# Patient Record
Sex: MALE | Employment: OTHER | URBAN - METROPOLITAN AREA
[De-identification: names, ages, dates, MRNs, and addresses within clinical notes are randomized per-mention and may not be internally consistent; named-entity substitution may affect disease eponyms.]

---

## 2017-02-20 ENCOUNTER — OFFICE VISIT (OUTPATIENT)
Dept: INTERNAL MEDICINE CLINIC | Age: 82
End: 2017-02-20

## 2017-02-20 VITALS
HEART RATE: 103 BPM | SYSTOLIC BLOOD PRESSURE: 160 MMHG | TEMPERATURE: 98.3 F | RESPIRATION RATE: 12 BRPM | HEIGHT: 77 IN | WEIGHT: 266.8 LBS | DIASTOLIC BLOOD PRESSURE: 102 MMHG | OXYGEN SATURATION: 97 % | BODY MASS INDEX: 31.5 KG/M2

## 2017-02-20 DIAGNOSIS — M54.2 NECK PAIN: ICD-10-CM

## 2017-02-20 DIAGNOSIS — R51.9 FRONTAL HEADACHE: ICD-10-CM

## 2017-02-20 DIAGNOSIS — I10 BENIGN ESSENTIAL HTN: Primary | ICD-10-CM

## 2017-02-20 RX ORDER — OMEPRAZOLE 20 MG/1
20 TABLET, DELAYED RELEASE ORAL DAILY
COMMUNITY
End: 2018-02-15 | Stop reason: ALTCHOICE

## 2017-02-20 RX ORDER — LISINOPRIL 10 MG/1
10 TABLET ORAL DAILY
Qty: 90 TAB | Refills: 0 | Status: SHIPPED | OUTPATIENT
Start: 2017-02-20 | End: 2017-03-21 | Stop reason: SDUPTHER

## 2017-02-20 RX ORDER — PNEUMOCOCCAL 13-VALENT CONJUGATE VACCINE 2.2; 2.2; 2.2; 2.2; 2.2; 4.4; 2.2; 2.2; 2.2; 2.2; 2.2; 2.2; 2.2 UG/.5ML; UG/.5ML; UG/.5ML; UG/.5ML; UG/.5ML; UG/.5ML; UG/.5ML; UG/.5ML; UG/.5ML; UG/.5ML; UG/.5ML; UG/.5ML; UG/.5ML
0.5 INJECTION, SUSPENSION INTRAMUSCULAR ONCE
Qty: 1 SYRINGE | Refills: 0 | Status: SHIPPED | OUTPATIENT
Start: 2017-02-20 | End: 2017-02-20

## 2017-02-20 NOTE — MR AVS SNAPSHOT
Visit Information Date & Time Provider Department Dept. Phone Encounter #  
 2/20/2017  1:45 PM Azael Chery MD [facility] [address] [id number] Upcoming Health Maintenance Date Due DTaP/Tdap/Td series (1 - Tdap) 9/9/1956 ZOSTER VACCINE AGE 60> 9/9/1997 GLAUCOMA SCREENING Q2Y 9/9/2002 Pneumococcal 65+ Low/Medium Risk (1 of 2 - PCV13) 9/9/2002 MEDICARE YEARLY EXAM 9/9/2002 INFLUENZA AGE 9 TO ADULT 8/1/2016 Allergies as of 2/20/2017  Review Complete On: 2/20/2017 By: Azael Chery MD  
  
 Severity Noted Reaction Type Reactions Influenza Virus Vaccine, Specific  02/20/2017    Other (comments) \"made him sick\" Current Immunizations  Reviewed on 2/20/2017 No immunizations on file. Reviewed by Azael Chery MD on 2/20/2017 at  2:31 PM  
You Were Diagnosed With   
  
 Codes Comments Benign essential HTN    -  Primary ICD-10-CM: I10 
ICD-9-CM: 401.1 Neck pain     ICD-10-CM: M54.2 ICD-9-CM: 723.1 Frontal headache     ICD-10-CM: R51 ICD-9-CM: 418. 0 Vitals BP Pulse Temp Resp Height(growth percentile) Weight(growth percentile) (!) 160/102 (BP 1 Location: Left arm, BP Patient Position: Sitting) (!) 103 98.3 °F (36.8 °C) (Oral) 12 6' 4.5\" (1.943 m) 266 lb 12.8 oz (121 kg) SpO2 BMI Smoking Status 97% 32.05 kg/m2 Former Smoker Vitals History BMI and BSA Data Body Mass Index Body Surface Area 32.05 kg/m 2 2.56 m 2 Preferred Pharmacy Pharmacy Name Phone [facility] #[id number]- [place], [place] -  [facility] [phone] Your Updated Medication List  
  
   
This list is accurate as of: 2/20/17  2:32 PM.  Always use your most recent med list.  
  
  
  
  
 ASPIR-81 PO Take  by mouth. IRON PO Take  by mouth.  
  
 lisinopril 10 mg tablet Commonly known as:  Kiara Cha Take 1 Tab by mouth daily. PREVNAR 13 (PF) 0.5 mL Syrg injection Generic drug:  pneumococcal 13 angelo conj dip  
0.5 mL by IntraMUSCular route once for 1 dose. PLEASE HAVE THIS AT YOUR LOCAL PHARMACY  Indications: PREVENTION OF STREPTOCOCCUS PNEUMONIAE INFECTION PriLOSEC OTC 20 mg tablet Generic drug:  omeprazole Take 20 mg by mouth daily. Prescriptions Printed Refills PREVNAR 13, PF, 0.5 mL syrg injection 0 Si.5 mL by IntraMUSCular route once for 1 dose. PLEASE HAVE THIS AT YOUR LOCAL PHARMACY  Indications: PREVENTION OF STREPTOCOCCUS PNEUMONIAE INFECTION Class: Print Route: IntraMUSCular Prescriptions Sent to Pharmacy Refills  
 lisinopril (PRINIVIL, ZESTRIL) 10 mg tablet 0 Sig: Take 1 Tab by mouth daily. Class: Normal  
 Pharmacy: [facility]/pharmacy [facility] [facility] #: [phone] Route: Oral  
  
We Performed the Following CBC W/O DIFF [42487 CPT(R)] METABOLIC PANEL, COMPREHENSIVE [42423 CPT(R)] Patient Instructions High Blood Pressure: Care Instructions Your Care Instructions If your blood pressure is usually above 140/90, you have high blood pressure, or hypertension. That means the top number is 140 or higher or the bottom number is 90 or higher, or both. Despite what a lot of people think, high blood pressure usually doesn't cause headaches or make you feel dizzy or lightheaded. It usually has no symptoms. But it does increase your risk for heart attack, stroke, and kidney or eye damage. The higher your blood pressure, the more your risk increases. Your doctor will give you a goal for your blood pressure. Your goal will be based on your health and your age. An example of a goal is to keep your blood pressure below 140/90. Lifestyle changes, such as eating healthy and being active, are always important to help lower blood pressure.  You might also take medicine to reach your blood pressure goal. 
Follow-up care is a key part of your treatment and safety. Be sure to make and go to all appointments, and call your doctor if you are having problems. It's also a good idea to know your test results and keep a list of the medicines you take. How can you care for yourself at home? Medical treatment · If you stop taking your medicine, your blood pressure will go back up. You may take one or more types of medicine to lower your blood pressure. Be safe with medicines. Take your medicine exactly as prescribed. Call your doctor if you think you are having a problem with your medicine. · Talk to your doctor before you start taking aspirin every day. Aspirin can help certain people lower their risk of a heart attack or stroke. But taking aspirin isn't right for everyone, because it can cause serious bleeding. · See your doctor regularly. You may need to see the doctor more often at first or until your blood pressure comes down. · If you are taking blood pressure medicine, talk to your doctor before you take decongestants or anti-inflammatory medicine, such as ibuprofen. Some of these medicines can raise blood pressure. · Learn how to check your blood pressure at home. Lifestyle changes · Stay at a healthy weight. This is especially important if you put on weight around the waist. Losing even 10 pounds can help you lower your blood pressure. · If your doctor recommends it, get more exercise. Walking is a good choice. Bit by bit, increase the amount you walk every day. Try for at least 30 minutes on most days of the week. You also may want to swim, bike, or do other activities. · Avoid or limit alcohol. Talk to your doctor about whether you can drink any alcohol. · Try to limit how much sodium you eat to less than 2,300 milligrams (mg) a day. Your doctor may ask you to try to eat less than 1,500 mg a day.  
· Eat plenty of fruits (such as bananas and oranges), vegetables, legumes, whole grains, and low-fat dairy products. · Lower the amount of saturated fat in your diet. Saturated fat is found in animal products such as milk, cheese, and meat. Limiting these foods may help you lose weight and also lower your risk for heart disease. · Do not smoke. Smoking increases your risk for heart attack and stroke. If you need help quitting, talk to your doctor about stop-smoking programs and medicines. These can increase your chances of quitting for good. When should you call for help? Call 911 anytime you think you may need emergency care. This may mean having symptoms that suggest that your blood pressure is causing a serious heart or blood vessel problem. Your blood pressure may be over 180/110. For example, call 911 if: 
· You have symptoms of a heart attack. These may include: ¨ Chest pain or pressure, or a strange feeling in the chest. 
¨ Sweating. ¨ Shortness of breath. ¨ Nausea or vomiting. ¨ Pain, pressure, or a strange feeling in the back, neck, jaw, or upper belly or in one or both shoulders or arms. ¨ Lightheadedness or sudden weakness. ¨ A fast or irregular heartbeat. · You have symptoms of a stroke. These may include: 
¨ Sudden numbness, tingling, weakness, or loss of movement in your face, arm, or leg, especially on only one side of your body. ¨ Sudden vision changes. ¨ Sudden trouble speaking. ¨ Sudden confusion or trouble understanding simple statements. ¨ Sudden problems with walking or balance. ¨ A sudden, severe headache that is different from past headaches. · You have severe back or belly pain. Do not wait until your blood pressure comes down on its own. Get help right away. Call your doctor now or seek immediate care if: 
· Your blood pressure is much higher than normal (such as 180/110 or higher), but you don't have symptoms. · You think high blood pressure is causing symptoms, such as: ¨ Severe headache. ¨ Blurry vision. Watch closely for changes in your health, and be sure to contact your doctor if: 
· Your blood pressure measures 140/90 or higher at least 2 times. That means the top number is 140 or higher or the bottom number is 90 or higher, or both. · You think you may be having side effects from your blood pressure medicine. · Your blood pressure is usually normal, but it goes above normal at least 2 times. Where can you learn more? Go to http://tarun-camelia.info/. Enter N498 in the search box to learn more about \"High Blood Pressure: Care Instructions. \" Current as of: August 8, 2016 Content Version: 11.1 © 8751-2798 [employer]. Care instructions adapted under license by [employer] (which disclaims liability or warranty for this information). If you have questions about a medical condition or this instruction, always ask your healthcare professional. [address] any warranty or liability for your use of this information. Introducing \[facility]\"" & HEALTH SERVICES! Alfredo Kendall introduces [employer] patient portal. Now you can access parts of your medical record, email your doctor's office, and request medication refills online. 1. In your internet browser, go to [email or web address] [employer]/I Had Cancerhart 2. Click on the First Time User? Click Here link in the Sign In box. You will see the New Member Sign Up page. 3. Enter your [employer] Access Code exactly as it appears below. You will not need to use this code after youve completed the sign-up process. If you do not sign up before the expiration date, you must request a new code. · [employer] Access Code: [email or web address] Expires: 5/21/2017  2:32 PM 
 
4. Enter the last four digits of your Social Security Number (xxxx) and Date of Birth (mm/dd/yyyy) as indicated and click Submit. You will be taken to the next sign-up page. 5. Create a [employer] ID.  This will be your [employer] login ID and cannot be changed, so think of one that is secure and easy to remember. 6. Create a [employer] password. You can change your password at any time. 7. Enter your Password Reset Question and Answer. This can be used at a later time if you forget your password. 8. Enter your e-mail address. You will receive e-mail notification when new information is available in [address] 9. Click Sign Up. You can now view and download portions of your medical record. 10. Click the Download Summary menu link to download a portable copy of your medical information. If you have questions, please visit the Frequently Asked Questions section of the [employer] website. Remember, [employer] is NOT to be used for urgent needs. For medical emergencies, dial 911. Now available from your iPhone and Android! Please provide this summary of care documentation to your next provider. Your primary care clinician is listed as Talita Izquierdo. If you have any questions after today's visit, please call [phone].

## 2017-02-20 NOTE — PROGRESS NOTES
Patient states he is here to establish care. He has had pain in the back of his neck, across his forehead. Left ear pops.

## 2017-02-20 NOTE — PATIENT INSTRUCTIONS

## 2017-02-21 LAB
ALBUMIN SERPL-MCNC: 4.1 G/DL (ref 3.5–4.8)
ALBUMIN/GLOB SERPL: 1.5 {RATIO} (ref 1.1–2.5)
ALP SERPL-CCNC: 110 IU/L (ref 39–117)
ALT SERPL-CCNC: 18 IU/L (ref 0–44)
AST SERPL-CCNC: 19 IU/L (ref 0–40)
BILIRUB SERPL-MCNC: 0.3 MG/DL (ref 0–1.2)
BUN SERPL-MCNC: 17 MG/DL (ref 8–27)
BUN/CREAT SERPL: 19 (ref 10–22)
CALCIUM SERPL-MCNC: 9.5 MG/DL (ref 8.6–10.2)
CHLORIDE SERPL-SCNC: 103 MMOL/L (ref 96–106)
CO2 SERPL-SCNC: 23 MMOL/L (ref 18–29)
CREAT SERPL-MCNC: 0.91 MG/DL (ref 0.76–1.27)
ERYTHROCYTE [DISTWIDTH] IN BLOOD BY AUTOMATED COUNT: 13.6 % (ref 12.3–15.4)
GLOBULIN SER CALC-MCNC: 2.8 G/DL (ref 1.5–4.5)
GLUCOSE SERPL-MCNC: 83 MG/DL (ref 65–99)
HCT VFR BLD AUTO: 44.2 % (ref 37.5–51)
HGB BLD-MCNC: 14.8 G/DL (ref 12.6–17.7)
MCH RBC QN AUTO: 31.4 PG (ref 26.6–33)
MCHC RBC AUTO-ENTMCNC: 33.5 G/DL (ref 31.5–35.7)
MCV RBC AUTO: 94 FL (ref 79–97)
PLATELET # BLD AUTO: 275 X10E3/UL (ref 150–379)
POTASSIUM SERPL-SCNC: 4.7 MMOL/L (ref 3.5–5.2)
PROT SERPL-MCNC: 6.9 G/DL (ref 6–8.5)
RBC # BLD AUTO: 4.71 X10E6/UL (ref 4.14–5.8)
SODIUM SERPL-SCNC: 141 MMOL/L (ref 134–144)
WBC # BLD AUTO: 6 X10E3/UL (ref 3.4–10.8)

## 2017-03-21 DIAGNOSIS — I10 BENIGN ESSENTIAL HTN: ICD-10-CM

## 2017-03-21 RX ORDER — LISINOPRIL 20 MG/1
20 TABLET ORAL DAILY
Qty: 90 TAB | Refills: 0 | Status: SHIPPED | OUTPATIENT
Start: 2017-03-21 | End: 2017-05-17 | Stop reason: SDUPTHER

## 2017-05-17 DIAGNOSIS — I10 BENIGN ESSENTIAL HTN: ICD-10-CM

## 2017-05-17 RX ORDER — LISINOPRIL 20 MG/1
20 TABLET ORAL DAILY
Qty: 90 TAB | Refills: 0 | Status: SHIPPED | OUTPATIENT
Start: 2017-05-17 | End: 2017-07-07 | Stop reason: SDUPTHER

## 2017-07-07 ENCOUNTER — OFFICE VISIT (OUTPATIENT)
Dept: INTERNAL MEDICINE CLINIC | Age: 82
End: 2017-07-07

## 2017-07-07 VITALS
HEIGHT: 77 IN | DIASTOLIC BLOOD PRESSURE: 94 MMHG | SYSTOLIC BLOOD PRESSURE: 156 MMHG | RESPIRATION RATE: 12 BRPM | OXYGEN SATURATION: 96 % | TEMPERATURE: 97.9 F | HEART RATE: 72 BPM | BODY MASS INDEX: 30.39 KG/M2 | WEIGHT: 257.4 LBS

## 2017-07-07 DIAGNOSIS — Z13.31 SCREENING FOR DEPRESSION: ICD-10-CM

## 2017-07-07 DIAGNOSIS — Z71.89 ADVANCED CARE PLANNING/COUNSELING DISCUSSION: ICD-10-CM

## 2017-07-07 DIAGNOSIS — M62.838 CERVICAL PARASPINAL MUSCLE SPASM: ICD-10-CM

## 2017-07-07 DIAGNOSIS — Z00.00 MEDICARE ANNUAL WELLNESS VISIT, SUBSEQUENT: Primary | ICD-10-CM

## 2017-07-07 DIAGNOSIS — H69.82 EUSTACHIAN TUBE DYSFUNCTION, LEFT: ICD-10-CM

## 2017-07-07 DIAGNOSIS — Z00.00 ROUTINE GENERAL MEDICAL EXAMINATION AT A HEALTH CARE FACILITY: ICD-10-CM

## 2017-07-07 DIAGNOSIS — I10 BENIGN ESSENTIAL HTN: ICD-10-CM

## 2017-07-07 DIAGNOSIS — Z13.39 SCREENING FOR ALCOHOLISM: ICD-10-CM

## 2017-07-07 RX ORDER — LORATADINE 10 MG/1
10 TABLET ORAL
Qty: 30 TAB | Refills: 0
Start: 2017-07-07 | End: 2018-02-15 | Stop reason: ALTCHOICE

## 2017-07-07 RX ORDER — LISINOPRIL 20 MG/1
20 TABLET ORAL DAILY
Qty: 90 TAB | Refills: 1 | Status: SHIPPED | OUTPATIENT
Start: 2017-07-07 | End: 2018-02-15 | Stop reason: SDUPTHER

## 2017-07-07 NOTE — PROGRESS NOTES
Nurse Navigator Medicare Wellness Visit performed by ZION Pritchard RN    This is a Subsequent AilynCodey Visit providing Personalized Prevention Plan Services (PPPS) (Performed 12 months after initial AWV and PPPS )    I have reviewed the patient's medical history in detail and updated the computerized patient record. History     Past Medical History:   Diagnosis Date    Arthritis     Benign essential HTN     took medication    GERD (gastroesophageal reflux disease)     Hernia     Iron deficiency anemia     Dr. Marian Hernandez. 2014. on Xarelto. did iron infusions.  Pulmonary emboli [facility]) 2014    [facility]. [facility]. Xarelto caused anemia      Past Surgical History:   Procedure Laterality Date    HX COLONOSCOPY  2014    no source of bleeding    HX ENDOSCOPY  2014    no sournce of bleeding    HX ORTHOPAEDIC      trigger fingers both pinky     Current Outpatient Prescriptions   Medication Sig Dispense Refill    lisinopril (PRINIVIL, ZESTRIL) 20 mg tablet Take 1 Tab by mouth daily. Increased dose 3/21/17 90 Tab 1    loratadine (CLARITIN) 10 mg tablet Take 1 Tab by mouth daily as needed for Allergies. 30 Tab 0    ASPIRIN (ASPIR-81 PO) Take  by mouth.  FERROUS SULFATE (IRON PO) Take  by mouth.  omeprazole (PRILOSEC OTC) 20 mg tablet Take 20 mg by mouth daily.        Allergies   Allergen Reactions    Influenza Virus Vaccine, Specific Other (comments)     \"made him sick\"     Family History   Problem Relation Age of Onset    Cancer Sister     Cancer Brother      Social History   Substance Use Topics    Smoking status: Former Smoker    Smokeless tobacco: Never Used    Alcohol use Yes      Comment: rare     Patient Active Problem List   Diagnosis Code    Chest pain, unspecified R07.9    SOB (shortness of breath) R06.02    Abnormal EKG R94.31    Benign essential HTN I10    Advanced care planning/counseling discussion Z71.89       Depression Risk Factor Screening:   Patient denies feelings of being down, depressed or hopeless at this time. Patient states that they have a strong support system within their family & friends. Patient states that he is the primary caregiver for his wife (who is ill) & he takes care of their [address] [place] [address] homes; all of this can be a bit overwhelming, but he states that he handles everything as best he can. Patient denies thoughts of harm to self or others. PHQ over the last two weeks 7/7/2017   PHQ Not Done -   Little interest or pleasure in doing things Not at all   Feeling down, depressed or hopeless Not at all   Total Score PHQ 2 0     Alcohol Risk Factor Screening: On any occasion during the past 3 months, have you had more than 4 drinks containing alcohol? No    Do you average more than 14 drinks per week? No        Functional Ability and Level of Safety:     Hearing Loss   mild    Activities of Daily Living   Self-care. Patient states that he lives with his wife in a private residence ( for 30 years). Patient states independence in all ADLs & denies the use of assistive devices for ambulation. NN encouraged patient to continue and/ or introduce routine physical exercise into their daily routine as applicable & as recommended by PCP. Patient verbalized understanding & agreement to take this into consideration. Patient states that he remains active with the upkeep of his homes/ land in [address] [place] [address] (i.e cutting the grass, cleaning).    Requires assistance with:   ADL Assessment 7/7/2017   Feeding yourself No Help Needed   Getting from bed to chair No Help Needed   Getting dressed No Help Needed   Bathing or showering No Help Needed   Walk across the room (includes cane/walker) No Help Needed   Using the telphone No Help Needed   Taking your medications No Help Needed   Preparing meals No Help Needed   Managing money (expenses/bills) No Help Needed   Moderately strenuous housework (laundry) No Help Needed   Shopping for personal items (toiletries/medicines) No Help Needed   Shopping for groceries No Help Needed   Driving No Help Needed   Climbing a flight of stairs No Help Needed   Getting to places beyond walking distances No Help Needed       Fall Risk   Fall Risk Assessment, last 12 mths 7/7/2017   Able to walk? Yes   Fall in past 12 months? No   Patient denies falls within the past year & verbalizes awareness of fall prevention strategies. Abuse Screen   Patient is not abused     Abuse Screening Questionnaire 7/7/2017   Do you ever feel afraid of your partner? N   Are you in a relationship with someone who physically or mentally threatens you? N   Is it safe for you to go home? Y       Review of Systems   Medicare Wellness Visit    Physical Examination     Evaluation of Cognitive Function:  Mood/affect:  neutral  Appearance: age appropriate and casually dressed  Family member/caregiver input: None present; however, patient reports a strong support system. No exam performed today, Medicare Wellness Visit. Patient Care Team:  Lauren Goodell, MD as PCP - General (Internal Medicine)    Advice/Referrals/Counseling   Education and counseling provided:  End-of-Life planning (with patient's consent)  Pneumococcal Vaccine  Influenza Vaccine  Prostate cancer screening tests (PSA, covered annually)  Colorectal cancer screening tests  Screening for glaucoma  tdap & shingles vaccinations      Assessment/Plan   1. NN discussed with patient about Advance Medical Directive & reviewed documentation with patient. Patient respectfully declined paperwork, but will consider for the future. 2. Patient declines all recommended immunizations (flu, tdap, shingles, pneumonia 23 & prevnar 13). NN informed patient of the benefits to receiving the recommended vaccinations. Patient verbalized understanding, but once again respectfully declined. PCP notified. Patient's health maintenance immunization record has been updated & is current.      3. Patient states that he follows his PCP's recommendations for PSA screenings & Colonoscopy screenings. Patient reports that he had a screening colonoscopy last year at [facility], but he is unable to recall the name of the GI physician. Patient states that the results were normal. REJI faxed Maxime Richard, [address] [facility] requesting a copy of patient's last colonoscopy screening report with patient's verbal approval.     4. Patient wears corrective lenses. Patient states that it has been a few years since his last routine eye exam & glaucoma screening. Patient verbalized awareness that he is due for a routine eye exam & glaucoma screening. Patient plans on going to Dr. Regino Koenig at [facility] as this is where his wife gets her eye exams. NN requested that patient notify PCP's office after eye exam completed. Patient verbalized agreement. Patient verbalized understanding of all information discussed. Patient was given the opportunity to ask questions. Medication reconciliation completed by MA/ LPN and reviewed by PCP. Patient provided AVS, either a printed version or electronic version in [employer], which includes Medicare Wellness Preventative Screening Table.

## 2017-07-07 NOTE — PROGRESS NOTES
HISTORY OF PRESENT ILLNESS  Carlos Weller is a 80 y.o. male. HPI   Provider Addendum  I personally saw and examined the patient. I have discussed and reviewed note with Nurse Navigator and agree with the Nurse Navigator's findings and recommendations for this Wellness Exam.  I was present during the key portions of separately billed procedures. Orders written and signed by me as per chart. EVONNE Arce    Subjective:   Carlos Weller is a 80 y.o. male with hypertension. Hypertension ROS: taking medications as instructed, no medication side effects noted, home BP monitoring in range of 973'D systolic over 77-03'S diastolic, no TIA's, no chest pain on exertion, no dyspnea on exertion, no swelling of ankles. New concerns: has been doing well with Lisinopril 20 mg daily; checks blood pressures at home and reports they run consistently in 103-515 systolic range; reports history of white coat hypertension. Complains of transient left sided neck pain and stiffness that radiates to left ear. Denies any specific injury but pain started last week. Had similar symptoms when he was seen last in 2/2017. Symptoms have improved significantly over the past several days.     Patient Active Problem List   Diagnosis Code    Chest pain, unspecified R07.9    SOB (shortness of breath) R06.02    Abnormal EKG R94.31    Benign essential HTN I10    Advanced care planning/counseling discussion Z71.89     Past Surgical History:   Procedure Laterality Date    HX COLONOSCOPY  2014    no source of bleeding    HX ENDOSCOPY  2014    no sournce of bleeding    HX ORTHOPAEDIC      trigger fingers both pinky     Social History     Social History    Marital status:      Spouse name: 4+1    Number of children: N/A    Years of education: N/A     Occupational History    Retired       Social History Main Topics    Smoking status: Former Smoker    Smokeless tobacco: Never Used    Alcohol use Yes Comment: rare    Drug use: Not on file    Sexual activity: No     Other Topics Concern    Not on file     Social History Narrative     Family History   Problem Relation Age of Onset    Cancer Sister     Cancer Brother      Current Outpatient Prescriptions   Medication Sig    lisinopril (PRINIVIL, ZESTRIL) 20 mg tablet Take 1 Tab by mouth daily. Increased dose 3/21/17    loratadine (CLARITIN) 10 mg tablet Take 1 Tab by mouth daily as needed for Allergies.  ASPIRIN (ASPIR-81 PO) Take  by mouth.  FERROUS SULFATE (IRON PO) Take  by mouth.  omeprazole (PRILOSEC OTC) 20 mg tablet Take 20 mg by mouth daily. No current facility-administered medications for this visit. Allergies   Allergen Reactions    Influenza Virus Vaccine, Specific Other (comments)     \"made him sick\"       There is no immunization history on file for this patient. Review of Systems   Constitutional: Negative for chills, fever and malaise/fatigue. HENT: Negative for congestion and sore throat. Respiratory: Negative for cough and shortness of breath. Cardiovascular: Negative for chest pain and palpitations. Gastrointestinal: Negative for nausea and vomiting. Musculoskeletal: Positive for neck pain. Skin: Negative for rash. Neurological: Negative for dizziness, tingling and headaches. BP (!) 156/94  Pulse 72  Temp 97.9 °F (36.6 °C) (Oral)   Resp 12  Ht 6' 4.5\" (1.943 m)  Wt 257 lb 6.4 oz (116.8 kg)  SpO2 96%  BMI 30.92 kg/m2  Physical Exam   Constitutional: He appears well-developed and well-nourished. HENT:   Head: Normocephalic and atraumatic. Right Ear: External ear and ear canal normal. Tympanic membrane is not injected and not erythematous. Left Ear: External ear and ear canal normal. Tympanic membrane is not injected and not erythematous. Nose: No mucosal edema. Mouth/Throat: No posterior oropharyngeal erythema. Neck: Neck supple. Muscular tenderness present.  Decreased range of motion present. No thyromegaly present. Lymphadenopathy:     He has no cervical adenopathy. Nursing note and vitals reviewed. ASSESSMENT and PLAN  Javid Natarajan was seen today for neck pain and annual wellness visit. Diagnoses and all orders for this visit:    Medicare annual wellness visit, subsequent    Benign essential HTN - reports blood pressures at home have been well controlled. -     lisinopril (PRINIVIL, ZESTRIL) 20 mg tablet; Take 1 Tab by mouth daily. Increased dose 3/21/17    Cervical paraspinal muscle spasm -- moist heat to area and stretching exercises. Hesitant to give any muscle relaxants due to sedative effects. Follow up INI. Eustachian tube dysfunction, left  -     loratadine (CLARITIN) 10 mg tablet; Take 1 Tab by mouth daily as needed for Allergies.     Advanced care planning/counseling discussion    Routine general medical examination at a health care facility    Screening for alcoholism    Screening for depression            lab results and schedule of future lab studies reviewed with patient  reviewed diet, exercise and weight control  reviewed medications and side effects in detail

## 2017-07-07 NOTE — MR AVS SNAPSHOT
Visit Information Date & Time Provider Department Dept. Phone Encounter #  
 7/7/2017  8:40 AM Alaina Holm NP [facility] [address] [id number] Upcoming Health Maintenance Date Due DTaP/Tdap/Td series (1 - Tdap) 9/9/1956 ZOSTER VACCINE AGE 60> 9/9/1997 GLAUCOMA SCREENING Q2Y 9/9/2002 Pneumococcal 65+ Low/Medium Risk (1 of 2 - PCV13) 9/9/2002 MEDICARE YEARLY EXAM 9/9/2002 INFLUENZA AGE 9 TO ADULT 8/1/2017 Allergies as of 7/7/2017  Review Complete On: 7/7/2017 By: Alaina Holm NP Severity Noted Reaction Type Reactions Influenza Virus Vaccine, Specific  02/20/2017    Other (comments) \"made him sick\" Current Immunizations  Reviewed on 2/20/2017 No immunizations on file. Not reviewed this visit You Were Diagnosed With   
  
 Codes Comments Cervical paraspinal muscle spasm    -  Primary ICD-10-CM: I76.314 ICD-9-CM: 728.85 Benign essential HTN     ICD-10-CM: I10 
ICD-9-CM: 401.1 Eustachian tube dysfunction, left     ICD-10-CM: S74.35 ICD-9-CM: 381.81 Vitals BP Pulse Temp Resp Height(growth percentile) Weight(growth percentile) (!) 170/106 (BP 1 Location: Right arm, BP Patient Position: Sitting) 72 97.9 °F (36.6 °C) (Oral) 12 6' 4.5\" (1.943 m) 257 lb 6.4 oz (116.8 kg) SpO2 BMI Smoking Status 96% 30.92 kg/m2 Former Smoker Vitals History BMI and BSA Data Body Mass Index Body Surface Area 30.92 kg/m 2 2.51 m 2 Preferred Pharmacy Pharmacy Name Phone [id number] [address] [phone] Your Updated Medication List  
  
   
This list is accurate as of: 7/7/17  9:35 AM.  Always use your most recent med list.  
  
  
  
  
 ASPIR-81 PO Take  by mouth. IRON PO Take  by mouth.  
  
 lisinopril 20 mg tablet Commonly known as:  Calli Kee  
 Take 1 Tab by mouth daily. Increased dose 3/21/17  
  
 loratadine 10 mg tablet Commonly known as:  Ricarda Sofia Take 1 Tab by mouth daily as needed for Allergies. PriLOSEC OTC 20 mg tablet Generic drug:  omeprazole Take 20 mg by mouth daily. Prescriptions Sent to Pharmacy Refills  
 lisinopril (PRINIVIL, ZESTRIL) 20 mg tablet 1 Sig: Take 1 Tab by mouth daily. Increased dose 3/21/17 Class: Normal  
 Pharmacy: [facility] [address]  #: [phone] Route: Oral  
  
Patient Instructions DASH Diet: Care Instructions Your Care Instructions The DASH diet is an eating plan that can help lower your blood pressure. DASH stands for Dietary Approaches to Stop Hypertension. Hypertension is high blood pressure. The DASH diet focuses on eating foods that are high in calcium, potassium, and magnesium. These nutrients can lower blood pressure. The foods that are highest in these nutrients are fruits, vegetables, low-fat dairy products, nuts, seeds, and legumes. But taking calcium, potassium, and magnesium supplements instead of eating foods that are high in those nutrients does not have the same effect. The DASH diet also includes whole grains, fish, and poultry. The DASH diet is one of several lifestyle changes your doctor may recommend to lower your high blood pressure. Your doctor may also want you to decrease the amount of sodium in your diet. Lowering sodium while following the DASH diet can lower blood pressure even further than just the DASH diet alone. Follow-up care is a key part of your treatment and safety. Be sure to make and go to all appointments, and call your doctor if you are having problems. It's also a good idea to know your test results and keep a list of the medicines you take. How can you care for yourself at home? Following the DASH diet · Eat 4 to 5 servings of fruit each day. A serving is 1 medium-sized piece of fruit, ½ cup chopped or canned fruit, 1/4 cup dried fruit, or 4 ounces (½ cup) of fruit juice. Choose fruit more often than fruit juice. · Eat 4 to 5 servings of vegetables each day. A serving is 1 cup of lettuce or raw leafy vegetables, ½ cup of chopped or cooked vegetables, or 4 ounces (½ cup) of vegetable juice. Choose vegetables more often than vegetable juice. · Get 2 to 3 servings of low-fat and fat-free dairy each day. A serving is 8 ounces of milk, 1 cup of yogurt, or 1 ½ ounces of cheese. · Eat 6 to 8 servings of grains each day. A serving is 1 slice of bread, 1 ounce of dry cereal, or ½ cup of cooked rice, pasta, or cooked cereal. Try to choose whole-grain products as much as possible. · Limit lean meat, poultry, and fish to 2 servings each day. A serving is 3 ounces, about the size of a deck of cards. · Eat 4 to 5 servings of nuts, seeds, and legumes (cooked dried beans, lentils, and split peas) each week. A serving is 1/3 cup of nuts, 2 tablespoons of seeds, or ½ cup of cooked beans or peas. · Limit fats and oils to 2 to 3 servings each day. A serving is 1 teaspoon of vegetable oil or 2 tablespoons of salad dressing. · Limit sweets and added sugars to 5 servings or less a week. A serving is 1 tablespoon jelly or jam, ½ cup sorbet, or 1 cup of lemonade. · Eat less than 2,300 milligrams (mg) of sodium a day. If you limit your sodium to 1,500 mg a day, you can lower your blood pressure even more. Tips for success · Start small. Do not try to make dramatic changes to your diet all at once. You might feel that you are missing out on your favorite foods and then be more likely to not follow the plan. Make small changes, and stick with them. Once those changes become habit, add a few more changes. · Try some of the following: ¨ Make it a goal to eat a fruit or vegetable at every meal and at snacks. This will make it easy to get the recommended amount of fruits and vegetables each day. ¨ Try yogurt topped with fruit and nuts for a snack or healthy dessert. ¨ Add lettuce, tomato, cucumber, and onion to sandwiches. ¨ Combine a ready-made pizza crust with low-fat mozzarella cheese and lots of vegetable toppings. Try using tomatoes, squash, spinach, broccoli, carrots, cauliflower, and onions. ¨ Have a variety of cut-up vegetables with a low-fat dip as an appetizer instead of chips and dip. ¨ Sprinkle sunflower seeds or chopped almonds over salads. Or try adding chopped walnuts or almonds to cooked vegetables. ¨ Try some vegetarian meals using beans and peas. Add garbanzo or kidney beans to salads. Make burritos and tacos with mashed damon beans or black beans. Where can you learn more? Go to http://tarun-camelia.info/. Enter M857 in the search box to learn more about \"DASH Diet: Care Instructions. \" Current as of: April 3, 2017 Content Version: 11.3 © 7780-8766 [employer]. Care instructions adapted under license by [employer] (which disclaims liability or warranty for this information). If you have questions about a medical condition or this instruction, always ask your healthcare professional. [place] [address] any warranty or liability for your use of this information. Neck Spasm: Care Instructions Your Care Instructions A neck spasm is sudden tightness and pain in your neck muscles. A spasm may be caused by some activities or repeated movements. For example, you may be more likely to have a neck spasm if you slouch, paint a ceiling, work at a computer, or sleep with your neck twisted. But the cause isn't always clear. Home treatment includes using heat or ice, taking over-the-counter (OTC) pain medicines, and avoiding activities that may lead to neck pain.  Gentle stretching, or treatments such as massage or manipulation, may also help ease a neck spasm. For a neck spasm that doesn't get better with home care, your doctor may prescribe medicine. He or she may also suggest exercise or physical therapy to help strengthen or relax your neck muscles. Follow-up care is a key part of your treatment and safety. Be sure to make and go to all appointments, and call your doctor if you are having problems. It's also a good idea to know your test results and keep a list of the medicines you take. How can you care for yourself at home? · To relieve pain, use heat or ice (whichever feels better) on the affected area. ¨ Put a warm water bottle, a heating pad set on low, or a warm cloth on your neck. Put a thin cloth between the heating pad and your skin. Do not go to sleep with a heating pad on your skin. ¨ Try ice or a cold pack on the area for 10 to 20 minutes at a time. Put a thin cloth between the ice and your skin. · Ask your doctor if you can take acetaminophen (such as Tylenol) or nonsteroidal anti-inflammatory drugs, such as ibuprofen or naproxen. Your doctor can prescribe stronger medicines if needed. Be safe with medicines. Read and follow all instructions on the label. · Stretch your muscles every day, especially before and after exercise and at bedtime. Regular stretching can help relax your muscles. · Try to find a pillow and a position in bed that help improve your night's rest. 
· Try to stay active. It's best to start activity slowly. If an exercise makes your pain worse, stop doing it. When should you call for help? Call 911 anytime you think you may need emergency care. For example, call if: 
· You are unable to move an arm or a leg at all. Call your doctor now or seek immediate medical care if: 
· You have new or worse symptoms in your arms, legs, belly, or buttocks. Symptoms may include: ¨ Numbness or tingling. ¨ Weakness. ¨ Pain. · You lose bladder or bowel control. Watch closely for changes in your health, and be sure to contact your doctor if: 
· You do not get better as expected. Where can you learn more? Go to http://tarun-camelia.info/. Enter G415 in the search box to learn more about \"Neck Spasm: Care Instructions. \" Current as of: March 21, 2017 Content Version: 11.3 © 4881-3811 [employer]. Care instructions adapted under license by [employer] (which disclaims liability or warranty for this information). If you have questions about a medical condition or this instruction, always ask your healthcare professional. [place] [address] any warranty or liability for your use of this information. Eustachian Tube Problems: Care Instructions Your Care Instructions The eustachian (say \"you-STAY-shee-un\") tubes run between the inside of the ears and the throat. They keep air pressure stable in the ears. If your eustachian tubes become blocked, the air pressure in your ears changes. The fluids from a cold can clog eustachian tubes, causing pain in the ears. A quick change in air pressure can cause eustachian tubes to close up. This might happen when an airplane changes altitude or when a  goes up or down underwater. Eustachian tube problems often clear up on their own or after antibiotic treatment. If your tubes continue to be blocked, you may need surgery. Follow-up care is a key part of your treatment and safety. Be sure to make and go to all appointments, and call your doctor if you are having problems. It's also a good idea to know your test results and keep a list of the medicines you take. How can you care for yourself at home? · To ease ear pain, apply a warm washcloth or a heating pad set on low. There may be some drainage from the ear when the heat melts earwax. Put a cloth between the heat source and your skin.  Do not use a heating pad with children. · If your doctor prescribed antibiotics, take them as directed. Do not stop taking them just because you feel better. You need to take the full course of antibiotics. · Your doctor may recommend over-the-counter medicine. Be safe with medicines. Oral or nasal decongestants may relieve ear pain. Avoid decongestants that are combined with antihistamines, which tend to cause more blockage. But if allergies seem to be the problem, your doctor may recommend a combination. Be careful with cough and cold medicines. Don't give them to children younger than 6, because they don't work for children that age and can even be harmful. For children 6 and older, always follow all the instructions carefully. Make sure you know how much medicine to give and how long to use it. And use the dosing device if one is included. When should you call for help? Call your doctor now or seek immediate medical care if: 
· You develop sudden, complete hearing loss. · You have severe pain or feel dizzy. · You have new or increasing pus or blood draining from your ear. · You have redness, swelling, or pain around or behind the ear. Watch closely for changes in your health, and be sure to contact your doctor if: 
· You do not get better after 2 weeks. · You have any new symptoms, such as itching or a feeling of fullness in the ear. Where can you learn more? Go to http://tarun-camelia.info/. Enter Y822 in the search box to learn more about \"Eustachian Tube Problems: Care Instructions. \" Current as of: May 4, 2017 Content Version: 11.3 © 3069-8655 [employer]. Care instructions adapted under license by [employer] (which disclaims liability or warranty for this information). If you have questions about a medical condition or this instruction, always ask your healthcare professional. [address] any warranty or liability for your use of this information. Neck: Exercises Your Care Instructions Here are some examples of typical rehabilitation exercises for your condition. Start each exercise slowly. Ease off the exercise if you start to have pain. Your doctor or physical therapist will tell you when you can start these exercises and which ones will work best for you. How to do the exercises Note: Stretching should make you feel a gentle stretch, but no pain. Stop any strengthening exercise that makes pain worse. Neck stretch 1. This stretch works best if you keep your shoulder down as you lean away from it. To help you remember to do this, start by relaxing your shoulders and lightly holding on to your thighs or your chair. 2. Tilt your head toward your shoulder and hold for 15 to 30 seconds. Let the weight of your head stretch your muscles. 3. If you would like a little added stretch, use your hand to gently and steadily pull your head toward your shoulder. For example, keeping your right shoulder down, lean your head to the left. 4. Repeat 2 to 4 times toward each shoulder. Diagonal neck stretch 1. Turn your head slightly toward the direction you will be stretching, and tilt your head diagonally toward your chest and hold for 15 to 30 seconds. 2. If you would like a little added stretch, use your hand to gently and steadily pull your head forward on the diagonal. 
3. Repeat 2 to 4 times toward each side. Dorsal glide stretch 1. Sit or stand tall and look straight ahead. 2. Slowly tuck your chin as you glide your head backward over your body 3. Hold for a count of 6, and then relax for up to 10 seconds. 4. Repeat 8 to 12 times. Note: The dorsal glide stretches the back of the neck. If you feel pain, do not glide so far back. Some people find this exercise easier to do while lying on their backs with an ice pack on the neck. Chest and shoulder stretch 1. Sit or stand tall and glide your head backward as in the dorsal glide stretch. 2. Raise both arms so that your hands are next to your ears. 3. Take a deep breath, and as you breathe out, lower your elbows down and behind your back. You will feel your shoulder blades slide down and together, and at the same time you will feel a stretch across your chest and the front of your shoulders. 4. Hold for about 6 seconds, and then relax for up to 10 seconds. 5. Repeat 8 to 12 times. Strengthening: Hands on head 1. Move your head backward, forward, and side to side against gentle pressure from your hands, holding each position for about 6 seconds. 2. Repeat 8 to 12 times. Follow-up care is a key part of your treatment and safety. Be sure to make and go to all appointments, and call your doctor if you are having problems. It's also a good idea to know your test results and keep a list of the medicines you take. Where can you learn more? Go to http://tarun-camelia.info/. Enter P975 in the search box to learn more about \"Neck: Exercises. \" Current as of: March 21, 2017 Content Version: 11.3 © 1015-0789 [employer]. Care instructions adapted under license by [facility] (which disclaims liability or warranty for this information). If you have questions about a medical condition or this instruction, always ask your healthcare professional. [address] any warranty or liability for your use of this information. Introducing \[facility]\"" & HEALTH SERVICES! Aditi Obrien introduces [employer] patient portal. Now you can access parts of your medical record, email your doctor's office, and request medication refills online. 1. In your internet browser, go to [email or web address] [email or web address] 2. Click on the First Time User? Click Here link in the Sign In box. You will see the New Member Sign Up page. 3. Enter your [employer] Access Code exactly as it appears below.  You will not need to use this code after youve completed the sign-up process. If you do not sign up before the expiration date, you must request a new code. · [employer] Access Code: [email or web address] Expires: 10/5/2017  9:35 AM 
 
4. Enter the last four digits of your Social Security Number (xxxx) and Date of Birth (mm/dd/yyyy) as indicated and click Submit. You will be taken to the next sign-up page. 5. Create a [employer] ID. This will be your [employer] login ID and cannot be changed, so think of one that is secure and easy to remember. 6. Create a [employer] password. You can change your password at any time. 7. Enter your Password Reset Question and Answer. This can be used at a later time if you forget your password. 8. Enter your e-mail address. You will receive e-mail notification when new information is available in [address]. 9. Click Sign Up. You can now view and download portions of your medical record. 10. Click the Download Summary menu link to download a portable copy of your medical information. If you have questions, please visit the Frequently Asked Questions section of the [employer] website. Remember, [employer] is NOT to be used for urgent needs. For medical emergencies, dial 911. Now available from your iPhone and Android! Please provide this summary of care documentation to your next provider. Your primary care clinician is listed as Clarita Dhaliwal. If you have any questions after today's visit, please call [phone].

## 2017-07-07 NOTE — PATIENT INSTRUCTIONS
DASH Diet: Care Instructions  Your Care Instructions  The DASH diet is an eating plan that can help lower your blood pressure. DASH stands for Dietary Approaches to Stop Hypertension. Hypertension is high blood pressure. The DASH diet focuses on eating foods that are high in calcium, potassium, and magnesium. These nutrients can lower blood pressure. The foods that are highest in these nutrients are fruits, vegetables, low-fat dairy products, nuts, seeds, and legumes. But taking calcium, potassium, and magnesium supplements instead of eating foods that are high in those nutrients does not have the same effect. The DASH diet also includes whole grains, fish, and poultry. The DASH diet is one of several lifestyle changes your doctor may recommend to lower your high blood pressure. Your doctor may also want you to decrease the amount of sodium in your diet. Lowering sodium while following the DASH diet can lower blood pressure even further than just the DASH diet alone. Follow-up care is a key part of your treatment and safety. Be sure to make and go to all appointments, and call your doctor if you are having problems. It's also a good idea to know your test results and keep a list of the medicines you take. How can you care for yourself at home? Following the DASH diet  · Eat 4 to 5 servings of fruit each day. A serving is 1 medium-sized piece of fruit, ½ cup chopped or canned fruit, 1/4 cup dried fruit, or 4 ounces (½ cup) of fruit juice. Choose fruit more often than fruit juice. · Eat 4 to 5 servings of vegetables each day. A serving is 1 cup of lettuce or raw leafy vegetables, ½ cup of chopped or cooked vegetables, or 4 ounces (½ cup) of vegetable juice. Choose vegetables more often than vegetable juice. · Get 2 to 3 servings of low-fat and fat-free dairy each day. A serving is 8 ounces of milk, 1 cup of yogurt, or 1 ½ ounces of cheese. · Eat 6 to 8 servings of grains each day.  A serving is 1 slice of bread, 1 ounce of dry cereal, or ½ cup of cooked rice, pasta, or cooked cereal. Try to choose whole-grain products as much as possible. · Limit lean meat, poultry, and fish to 2 servings each day. A serving is 3 ounces, about the size of a deck of cards. · Eat 4 to 5 servings of nuts, seeds, and legumes (cooked dried beans, lentils, and split peas) each week. A serving is 1/3 cup of nuts, 2 tablespoons of seeds, or ½ cup of cooked beans or peas. · Limit fats and oils to 2 to 3 servings each day. A serving is 1 teaspoon of vegetable oil or 2 tablespoons of salad dressing. · Limit sweets and added sugars to 5 servings or less a week. A serving is 1 tablespoon jelly or jam, ½ cup sorbet, or 1 cup of lemonade. · Eat less than 2,300 milligrams (mg) of sodium a day. If you limit your sodium to 1,500 mg a day, you can lower your blood pressure even more. Tips for success  · Start small. Do not try to make dramatic changes to your diet all at once. You might feel that you are missing out on your favorite foods and then be more likely to not follow the plan. Make small changes, and stick with them. Once those changes become habit, add a few more changes. · Try some of the following:  ¨ Make it a goal to eat a fruit or vegetable at every meal and at snacks. This will make it easy to get the recommended amount of fruits and vegetables each day. ¨ Try yogurt topped with fruit and nuts for a snack or healthy dessert. ¨ Add lettuce, tomato, cucumber, and onion to sandwiches. ¨ Combine a ready-made pizza crust with low-fat mozzarella cheese and lots of vegetable toppings. Try using tomatoes, squash, spinach, broccoli, carrots, cauliflower, and onions. ¨ Have a variety of cut-up vegetables with a low-fat dip as an appetizer instead of chips and dip. ¨ Sprinkle sunflower seeds or chopped almonds over salads. Or try adding chopped walnuts or almonds to cooked vegetables. ¨ Try some vegetarian meals using beans and peas. Add garbanzo or kidney beans to salads. Make burritos and tacos with mashed damon beans or black beans. Where can you learn more? Go to http://tarun-camelia.info/. Enter E575 in the search box to learn more about \"DASH Diet: Care Instructions. \"  Current as of: April 3, 2017  Content Version: 11.3  © 8624-7075 [employer]. Care instructions adapted under license by [employer] (which disclaims liability or warranty for this information). If you have questions about a medical condition or this instruction, always ask your healthcare professional. [place] [address] any warranty or liability for your use of this information. Neck Spasm: Care Instructions  Your Care Instructions  A neck spasm is sudden tightness and pain in your neck muscles. A spasm may be caused by some activities or repeated movements. For example, you may be more likely to have a neck spasm if you slouch, paint a ceiling, work at a computer, or sleep with your neck twisted. But the cause isn't always clear. Home treatment includes using heat or ice, taking over-the-counter (OTC) pain medicines, and avoiding activities that may lead to neck pain. Gentle stretching, or treatments such as massage or manipulation, may also help ease a neck spasm. For a neck spasm that doesn't get better with home care, your doctor may prescribe medicine. He or she may also suggest exercise or physical therapy to help strengthen or relax your neck muscles. Follow-up care is a key part of your treatment and safety. Be sure to make and go to all appointments, and call your doctor if you are having problems. It's also a good idea to know your test results and keep a list of the medicines you take. How can you care for yourself at home? · To relieve pain, use heat or ice (whichever feels better) on the affected area. ¨ Put a warm water bottle, a heating pad set on low, or a warm cloth on your neck.  Put a thin cloth between the heating pad and your skin. Do not go to sleep with a heating pad on your skin. ¨ Try ice or a cold pack on the area for 10 to 20 minutes at a time. Put a thin cloth between the ice and your skin. · Ask your doctor if you can take acetaminophen (such as Tylenol) or nonsteroidal anti-inflammatory drugs, such as ibuprofen or naproxen. Your doctor can prescribe stronger medicines if needed. Be safe with medicines. Read and follow all instructions on the label. · Stretch your muscles every day, especially before and after exercise and at bedtime. Regular stretching can help relax your muscles. · Try to find a pillow and a position in bed that help improve your night's rest.  · Try to stay active. It's best to start activity slowly. If an exercise makes your pain worse, stop doing it. When should you call for help? Call 911 anytime you think you may need emergency care. For example, call if:  · You are unable to move an arm or a leg at all. Call your doctor now or seek immediate medical care if:  · You have new or worse symptoms in your arms, legs, belly, or buttocks. Symptoms may include:  ¨ Numbness or tingling. ¨ Weakness. ¨ Pain. · You lose bladder or bowel control. Watch closely for changes in your health, and be sure to contact your doctor if:  · You do not get better as expected. Where can you learn more? Go to http://tarun-camelia.info/. Enter V902 in the search box to learn more about \"Neck Spasm: Care Instructions. \"  Current as of: March 21, 2017  Content Version: 11.3  © 1027-1791 [employer]. Care instructions adapted under license by [employer] (which disclaims liability or warranty for this information). If you have questions about a medical condition or this instruction, always ask your healthcare professional. [place] [address] any warranty or liability for your use of this information.        Eustachian Tube Problems: Care Instructions  Your Care Instructions    The eustachian (say \"you-STAY-shee-un\") tubes run between the inside of the ears and the throat. They keep air pressure stable in the ears. If your eustachian tubes become blocked, the air pressure in your ears changes. The fluids from a cold can clog eustachian tubes, causing pain in the ears. A quick change in air pressure can cause eustachian tubes to close up. This might happen when an airplane changes altitude or when a  goes up or down underwater. Eustachian tube problems often clear up on their own or after antibiotic treatment. If your tubes continue to be blocked, you may need surgery. Follow-up care is a key part of your treatment and safety. Be sure to make and go to all appointments, and call your doctor if you are having problems. It's also a good idea to know your test results and keep a list of the medicines you take. How can you care for yourself at home? · To ease ear pain, apply a warm washcloth or a heating pad set on low. There may be some drainage from the ear when the heat melts earwax. Put a cloth between the heat source and your skin. Do not use a heating pad with children. · If your doctor prescribed antibiotics, take them as directed. Do not stop taking them just because you feel better. You need to take the full course of antibiotics. · Your doctor may recommend over-the-counter medicine. Be safe with medicines. Oral or nasal decongestants may relieve ear pain. Avoid decongestants that are combined with antihistamines, which tend to cause more blockage. But if allergies seem to be the problem, your doctor may recommend a combination. Be careful with cough and cold medicines. Don't give them to children younger than 6, because they don't work for children that age and can even be harmful. For children 6 and older, always follow all the instructions carefully.  Make sure you know how much medicine to give and how long to use it. And use the dosing device if one is included. When should you call for help? Call your doctor now or seek immediate medical care if:  · You develop sudden, complete hearing loss. · You have severe pain or feel dizzy. · You have new or increasing pus or blood draining from your ear. · You have redness, swelling, or pain around or behind the ear. Watch closely for changes in your health, and be sure to contact your doctor if:  · You do not get better after 2 weeks. · You have any new symptoms, such as itching or a feeling of fullness in the ear. Where can you learn more? Go to http://tarun-camelia.info/. Enter Y822 in the search box to learn more about \"Eustachian Tube Problems: Care Instructions. \"  Current as of: May 4, 2017  Content Version: 11.3  © 1918-8839 [employer]. Care instructions adapted under license by [employer] (which disclaims liability or warranty for this information). If you have questions about a medical condition or this instruction, always ask your healthcare professional. [address] any warranty or liability for your use of this information. Neck: Exercises  Your Care Instructions  Here are some examples of typical rehabilitation exercises for your condition. Start each exercise slowly. Ease off the exercise if you start to have pain. Your doctor or physical therapist will tell you when you can start these exercises and which ones will work best for you. How to do the exercises  Note: Stretching should make you feel a gentle stretch, but no pain. Stop any strengthening exercise that makes pain worse. Neck stretch    1. This stretch works best if you keep your shoulder down as you lean away from it. To help you remember to do this, start by relaxing your shoulders and lightly holding on to your thighs or your chair. 2. Tilt your head toward your shoulder and hold for 15 to 30 seconds.  Let the weight of your head stretch your muscles. 3. If you would like a little added stretch, use your hand to gently and steadily pull your head toward your shoulder. For example, keeping your right shoulder down, lean your head to the left. 4. Repeat 2 to 4 times toward each shoulder. Diagonal neck stretch    1. Turn your head slightly toward the direction you will be stretching, and tilt your head diagonally toward your chest and hold for 15 to 30 seconds. 2. If you would like a little added stretch, use your hand to gently and steadily pull your head forward on the diagonal.  3. Repeat 2 to 4 times toward each side. Dorsal glide stretch    1. Sit or stand tall and look straight ahead. 2. Slowly tuck your chin as you glide your head backward over your body  3. Hold for a count of 6, and then relax for up to 10 seconds. 4. Repeat 8 to 12 times. Note: The dorsal glide stretches the back of the neck. If you feel pain, do not glide so far back. Some people find this exercise easier to do while lying on their backs with an ice pack on the neck. Chest and shoulder stretch    1. Sit or stand tall and glide your head backward as in the dorsal glide stretch. 2. Raise both arms so that your hands are next to your ears. 3. Take a deep breath, and as you breathe out, lower your elbows down and behind your back. You will feel your shoulder blades slide down and together, and at the same time you will feel a stretch across your chest and the front of your shoulders. 4. Hold for about 6 seconds, and then relax for up to 10 seconds. 5. Repeat 8 to 12 times. Strengthening: Hands on head    1. Move your head backward, forward, and side to side against gentle pressure from your hands, holding each position for about 6 seconds. 2. Repeat 8 to 12 times. Follow-up care is a key part of your treatment and safety. Be sure to make and go to all appointments, and call your doctor if you are having problems.  It's also a good idea to know your test results and keep a list of the medicines you take. Where can you learn more? Go to http://tarun-camelia.info/. Enter P975 in the search box to learn more about \"Neck: Exercises. \"  Current as of: March 21, 2017  Content Version: 11.3  © 3808-1941 [employer]. Care instructions adapted under license by [employer] (which disclaims liability or warranty for this information). If you have questions about a medical condition or this instruction, always ask your healthcare professional. [address] any warranty or liability for your use of this information. Medicare Part B Preventive Services Guidelines/Limitations Date last completed and Frequency Due Date   Cardiovascular Screening Blood Tests (every 5 years)  Total cholesterol, HDL, Triglycerides Order as a panel if possible As recommended by your PCP or Specialist    As recommended by your PCP As recommended by your PCP or Specialist   Colorectal Cancer Screening  -Fecal occult blood test (annual)  -Flexible sigmoidoscopy (5y)  -Screening colonoscopy (10y)  -Barium Enema Age 49-80; After age [de-identified] if history of abnormal results Completed last year     Recommended every 5 to 10 years  As recommended by your PCP or Specialist     Counseling to Prevent Tobacco Use (up to 8 sessions per year)  - Counseling greater than 3 and up to 10 minutes  - Counseling greater than 10 minutes Patients must be asymptomatic of tobacco-related conditions to receive as preventive service N/A N/A   Diabetes Screening Tests (at least every 3 years, Medicare covers annually or at 6-month intervals for prediabetic patients)    Fasting blood sugar (FBS) or glucose tolerance test (GTT) Patient must be diagnosed with one of the following:  -Hypertension, Dyslipidemia, obesity, previous impaired FBS or GTT  Or any two of the following: overweight, FH of diabetes, age ? 72, history of gestational diabetes, birth of baby weighing more than 9 pounds Completed 2/2017    Recommended every 3 years for non-diabetics     As recommended by your PCP or Specialist     Glaucoma Screening (no USPSTF recommendation) Diabetes mellitus, family history, , age 48 or over,  American, age 72 or over Completed several years ago    Recommended annually As recommended by your PCP or Specialist   Prostate Cancer Screening (annually up to age 76)  - Digital rectal exam (RADHA)  - Prostate specific antigen (PSA) Annually (age 48 or over), RADHA not paid separately when covered E/M service is provided on same date As recommended by your PCP or Specialist    Recommended annually to age 76 As recommended by your PCP or Specialist     Seasonal Influenza Vaccination (annually)  Recommended Annually Patient declined   TDAP Vaccination  Recommended every 10 years   Patient declined   Zoster (Shingles) Vaccination Covered by Medicare Part D through the pharmacy- PCP provides prescription Recommended once over age 48  Patient declined   Pneumococcal Vaccination (once after 72)  Pneumo 21-   Recommended once over the age of 72    Prevnar 15-  Recommended once over the age of 72 Patient declined        Patient declined   Ultrasound Screening for Abdominal Aortic Aneurysm (AAA) (once) Patient must be referred through IPPE and not have had a screening for abdominal aortic aneurysm before under Medicare. Limited to patients who meet one of the following criteria:  - Men who are 73-68 years old and have smoked more than 100 cigarettes in their lifetime.  -Anyone with a FH of AAA  -Anyone recommended for screening by USPSTF Not indicated unless recommended by PCP   Not indicated unless recommended by PCP     Family Practice Management 2011    Please bring a copy of your completed advance medical directive to the office so it may be added to your medical record. Thank you.     If you have any questions or concerns please feel free to contact me at [phone]. It was a pleasure meeting you today and participating in your healthcare.   Michelle Roman RN

## 2018-02-15 ENCOUNTER — OFFICE VISIT (OUTPATIENT)
Dept: INTERNAL MEDICINE CLINIC | Age: 83
End: 2018-02-15

## 2018-02-15 VITALS
TEMPERATURE: 97.9 F | OXYGEN SATURATION: 97 % | BODY MASS INDEX: 31.05 KG/M2 | RESPIRATION RATE: 12 BRPM | HEART RATE: 72 BPM | SYSTOLIC BLOOD PRESSURE: 155 MMHG | DIASTOLIC BLOOD PRESSURE: 95 MMHG | WEIGHT: 263 LBS | HEIGHT: 77 IN

## 2018-02-15 DIAGNOSIS — I10 BENIGN ESSENTIAL HTN: Primary | ICD-10-CM

## 2018-02-15 DIAGNOSIS — I10 WHITE COAT SYNDROME WITH HYPERTENSION: ICD-10-CM

## 2018-02-15 RX ORDER — LISINOPRIL 20 MG/1
20 TABLET ORAL DAILY
Qty: 90 TAB | Refills: 1 | Status: SHIPPED | OUTPATIENT
Start: 2018-02-15 | End: 2018-08-07 | Stop reason: SDUPTHER

## 2018-02-15 NOTE — MR AVS SNAPSHOT
[address] [facility] 
 
 
 [address] [place] [address] [facility] 
[phone] Patient: Wally Colvin MRN: [id number] CATALINA:[id number] Visit Information Date & Time Provider Department Dept. Phone Encounter #  
 2/15/2018  8:15 AM Ana Jacobsen MD [facility] [address] [id number] Upcoming Health Maintenance Date Due  
 GLAUCOMA SCREENING Q2Y 9/9/2019* Pneumococcal 65+ Low/Medium Risk (2 of 2 - PPSV23) 7/7/2018 MEDICARE YEARLY EXAM 7/8/2018 DTaP/Tdap/Td series (2 - Td) 7/7/2027 *Topic was postponed. The date shown is not the original due date. Allergies as of 2/15/2018  Review Complete On: 2/15/2018 By: Ana Jacobsen MD  
  
 Severity Noted Reaction Type Reactions Influenza Virus Vaccine, Specific  02/20/2017    Other (comments) \"made him sick\" Current Immunizations  Reviewed on 2/20/2017 No immunizations on file. Not reviewed this visit You Were Diagnosed With   
  
 Codes Comments Benign essential HTN    -  Primary ICD-10-CM: I10 
ICD-9-CM: 628. 1 White coat syndrome with hypertension     ICD-10-CM: I10 
ICD-9-CM: 401.9 Vitals BP Pulse Temp Resp Height(growth percentile) Weight(growth percentile) (!) 155/95 (BP 1 Location: Left arm, BP Patient Position: Sitting) 72 97.9 °F (36.6 °C) (Oral) 12 6' 4.5\" (1.943 m) 263 lb (119.3 kg) SpO2 BMI Smoking Status 97% 31.6 kg/m2 Former Smoker Vitals History BMI and BSA Data Body Mass Index Body Surface Area  
 31.6 kg/m 2 2.54 m 2 Preferred Pharmacy Pharmacy Name Phone [facility] #[id number]- [facility] [address] [phone] Your Updated Medication List  
  
   
This list is accurate as of: 2/15/18  9:04 AM.  Always use your most recent med list.  
  
  
  
  
 ASPIR-81 PO Take  by mouth. IRON PO Take  by mouth. lisinopril 20 mg tablet Commonly known as:  Diane [employer]ands Take 1 Tab by mouth daily. Increased dose 3/21/17 Prescriptions Sent to Pharmacy Refills  
 lisinopril (PRINIVIL, ZESTRIL) 20 mg tablet 1 Sig: Take 1 Tab by mouth daily. Increased dose 3/21/17 Class: Normal  
 Pharmacy: [facility]/pharmacy [address] [facility] #: [phone] Route: Oral  
  
We Performed the Following CBC W/O DIFF [29430 CPT(R)] LIPID PANEL [98476 CPT(R)] METABOLIC PANEL, COMPREHENSIVE [45501 CPT(R)] TSH 3RD GENERATION [37805 CPT(R)] Introducing [facility]! [address] [facility] introduces Shineon patient portal. Now you can access parts of your medical record, email your doctor's office, and request medication refills online. 1. In your internet browser, go to [email or web address] 2. Click on the First Time User? Click Here link in the Sign In box. You will see the New Member Sign Up page. 3. Enter your Shineon Access Code exactly as it appears below. You will not need to use this code after youve completed the sign-up process. If you do not sign up before the expiration date, you must request a new code. · Shineon Access Code: [email or web address] Expires: 5/16/2018  9:04 AM 
 
4. Enter the last four digits of your Social Security Number (xxxx) and Date of Birth (mm/dd/yyyy) as indicated and click Submit. You will be taken to the next sign-up page. 5. Create a Shineon ID. This will be your Shineon login ID and cannot be changed, so think of one that is secure and easy to remember. 6. Create a Shineon password. You can change your password at any time. 7. Enter your Password Reset Question and Answer. This can be used at a later time if you forget your password. 8. Enter your e-mail address. You will receive e-mail notification when new information is available in [address] 9. Click Sign Up. You can now view and download portions of your medical record. 10. Click the Download Summary menu link to download a portable copy of your medical information. If you have questions, please visit the Frequently Asked Questions section of the Songdrop website. Remember, Songdrop is NOT to be used for urgent needs. For medical emergencies, dial 911. Now available from your iPhone and Android! Please provide this summary of care documentation to your next provider. Your primary care clinician is listed as Jerald Huertas. If you have any questions after today's visit, please call [phone].

## 2018-02-15 NOTE — PROGRESS NOTES
HISTORY OF PRESENT ILLNESS    Chief Complaint   Patient presents with    Hypertension       Presents for follow-up    Hypertension- reports white coat HTN. Hypertension ROS: taking medications as instructed, no medication side effects noted, home BP monitoring in range of 717-873'W systolic over 50'B diastolic, no TIA's, no chest pain on exertion, no dyspnea on exertion, no swelling of ankles     reports that he has quit smoking. He has never used smokeless tobacco.    reports that he drinks alcohol. BP Readings from Last 2 Encounters:   02/15/18 (!) 155/95   07/07/17 (!) 156/94       Review of Systems   All other systems reviewed and are negative, except as noted in HPI    Past Medical and Surgical History   has a past medical history of Arthritis; Benign essential HTN; GERD (gastroesophageal reflux disease); Hernia; Iron deficiency anemia; Pulmonary emboli ([facility] [address] (2014); and White coat syndrome with hypertension (2/15/2018). has a past surgical history that includes hx orthopaedic; hx colonoscopy (2014); and hx endoscopy (2014). reports that he has quit smoking. He has never used smokeless tobacco. He reports that he drinks alcohol.  family history includes Cancer in his brother and sister. Physical Exam   Nursing note and vitals reviewed. Blood pressure (!) 155/95, pulse 72, temperature 97.9 °F (36.6 °C), temperature source Oral, resp. rate 12, height 6' 4.5\" (1.943 m), weight 263 lb (119.3 kg), SpO2 97 %. Constitutional:  No distress. Eyes: Conjunctivae are normal.   Ears:  Hearing grossly intact  Cardiovascular: Normal rate. regular rhythm, no murmurs or gallops  No edema  Pulmonary/Chest: Effort normal.   CTAB  Musculoskeletal: moves all 4 extremities   Neurological: Alert and oriented to person, place, and time. Skin: No rash noted. Psychiatric: Normal mood and affect. Behavior is normal.     ASSESSMENT and PLAN  Diagnoses and all orders for this visit:    1.  Benign essential HTN  - LIPID PANEL  -     METABOLIC PANEL, COMPREHENSIVE  -     CBC W/O DIFF  -     TSH 3RD GENERATION  -     lisinopril (PRINIVIL, ZESTRIL) 20 mg tablet; Take 1 Tab by mouth daily. Increased dose 3/21/17    2. White coat syndrome with hypertension  Reports home BPs are ok- he is anxious  Bring in cuff next visit      There are no Patient Instructions on file for this visit.    lab results and schedule of future lab studies reviewed with patient  reviewed medications and side effects in detail    Return to clinic for further evaluation if new symptoms develop    Follow-up Disposition: Not on File    Current Outpatient Prescriptions   Medication Sig    lisinopril (PRINIVIL, ZESTRIL) 20 mg tablet Take 1 Tab by mouth daily. Increased dose 3/21/17    ASPIRIN (ASPIR-81 PO) Take  by mouth.  FERROUS SULFATE (IRON PO) Take  by mouth. No current facility-administered medications for this visit.

## 2018-02-16 LAB
ALBUMIN SERPL-MCNC: 4 G/DL (ref 3.5–4.7)
ALBUMIN/GLOB SERPL: 1.6 {RATIO} (ref 1.2–2.2)
ALP SERPL-CCNC: 97 IU/L (ref 39–117)
ALT SERPL-CCNC: 17 IU/L (ref 0–44)
AST SERPL-CCNC: 17 IU/L (ref 0–40)
BILIRUB SERPL-MCNC: 0.2 MG/DL (ref 0–1.2)
BUN SERPL-MCNC: 16 MG/DL (ref 8–27)
BUN/CREAT SERPL: 17 (ref 10–24)
CALCIUM SERPL-MCNC: 9.3 MG/DL (ref 8.6–10.2)
CHLORIDE SERPL-SCNC: 102 MMOL/L (ref 96–106)
CHOLEST SERPL-MCNC: 201 MG/DL (ref 100–199)
CO2 SERPL-SCNC: 26 MMOL/L (ref 18–29)
CREAT SERPL-MCNC: 0.96 MG/DL (ref 0.76–1.27)
ERYTHROCYTE [DISTWIDTH] IN BLOOD BY AUTOMATED COUNT: 14 % (ref 12.3–15.4)
GFR SERPLBLD CREATININE-BSD FMLA CKD-EPI: 74 ML/MIN/1.73
GFR SERPLBLD CREATININE-BSD FMLA CKD-EPI: 86 ML/MIN/1.73
GLOBULIN SER CALC-MCNC: 2.5 G/DL (ref 1.5–4.5)
GLUCOSE SERPL-MCNC: 92 MG/DL (ref 65–99)
HCT VFR BLD AUTO: 42 % (ref 37.5–51)
HDLC SERPL-MCNC: 53 MG/DL
HGB BLD-MCNC: 14 G/DL (ref 13–17.7)
INTERPRETATION, 910389: NORMAL
LDLC SERPL CALC-MCNC: 126 MG/DL (ref 0–99)
MCH RBC QN AUTO: 30.4 PG (ref 26.6–33)
MCHC RBC AUTO-ENTMCNC: 33.3 G/DL (ref 31.5–35.7)
MCV RBC AUTO: 91 FL (ref 79–97)
PLATELET # BLD AUTO: 294 X10E3/UL (ref 150–379)
POTASSIUM SERPL-SCNC: 4.9 MMOL/L (ref 3.5–5.2)
PROT SERPL-MCNC: 6.5 G/DL (ref 6–8.5)
RBC # BLD AUTO: 4.6 X10E6/UL (ref 4.14–5.8)
SODIUM SERPL-SCNC: 139 MMOL/L (ref 134–144)
TRIGL SERPL-MCNC: 111 MG/DL (ref 0–149)
TSH SERPL DL<=0.005 MIU/L-ACNC: 1.9 UIU/ML (ref 0.45–4.5)
VLDLC SERPL CALC-MCNC: 22 MG/DL (ref 5–40)
WBC # BLD AUTO: 6.3 X10E3/UL (ref 3.4–10.8)

## 2018-06-26 ENCOUNTER — TELEPHONE (OUTPATIENT)
Dept: INTERNAL MEDICINE CLINIC | Age: 83
End: 2018-06-26

## 2018-06-26 NOTE — TELEPHONE ENCOUNTER
----- Message from Brent Conde sent at 6/26/2018  7:10 AM EDT -----  Regarding: Dr. Fay John  Pt's wife Benjamin Mcqueen is requesting for the pt to come in today or tomorrow due to a hernia that is painful, wife stated it's not as painful as it was however the pt is stating to her it's time for him to see a doctor. Mrs. Gibbs best contact number is [phone]

## 2018-06-26 NOTE — TELEPHONE ENCOUNTER
Patients wife called back in regards to scheduling an appointment. Patients wife would like a call back. Thank you.

## 2018-06-27 ENCOUNTER — OFFICE VISIT (OUTPATIENT)
Dept: INTERNAL MEDICINE CLINIC | Age: 83
End: 2018-06-27

## 2018-06-27 ENCOUNTER — TELEPHONE (OUTPATIENT)
Dept: INTERNAL MEDICINE CLINIC | Age: 83
End: 2018-06-27

## 2018-06-27 VITALS
TEMPERATURE: 97.6 F | DIASTOLIC BLOOD PRESSURE: 94 MMHG | HEIGHT: 77 IN | HEART RATE: 62 BPM | RESPIRATION RATE: 12 BRPM | OXYGEN SATURATION: 98 % | BODY MASS INDEX: 31.17 KG/M2 | SYSTOLIC BLOOD PRESSURE: 148 MMHG | WEIGHT: 264 LBS

## 2018-06-27 DIAGNOSIS — K40.90 LEFT INGUINAL HERNIA: Primary | ICD-10-CM

## 2018-06-27 DIAGNOSIS — I10 BENIGN ESSENTIAL HTN: ICD-10-CM

## 2018-06-27 NOTE — PROGRESS NOTES
HISTORY OF PRESENT ILLNESS  Sunshine Elkins is a [de-identified] y.o. male. HPI  Presents with complaints of left groin pain and bulging that worsened for 4 days after he was doing heavy lifting of shingles to build a roof. Has noted bulging in same area in the past but has not been this painful for the past 2 years. He has taken Advil 1 tab daily for the past several days and symptoms started feeling better yesterday. Has had some radiation of pain into left scrotal area but denies abdominal pain, nausea, vomiting, diarrhea. Denies fever, chills. Denies difficulty voiding. Review of Systems   Constitutional: Negative for chills, fever and malaise/fatigue. Respiratory: Negative for cough and shortness of breath. Cardiovascular: Negative for chest pain and palpitations. Gastrointestinal: Negative for abdominal pain, constipation, diarrhea, nausea and vomiting. Genitourinary: Negative for dysuria and frequency. Musculoskeletal: Negative for myalgias. Skin: Negative for rash. Neurological: Negative for dizziness and headaches. BP (!) 148/94 (BP 1 Location: Left arm, BP Patient Position: Sitting) Comment: manual cuff  Pulse 62  Temp 97.6 °F (36.4 °C) (Oral)   Resp 12  Ht 6' 4.5\" (1.943 m)  Wt 264 lb (119.7 kg)  SpO2 98%  BMI 31.72 kg/m2  Physical Exam   Constitutional: He is oriented to person, place, and time. He appears well-developed and well-nourished. HENT:   Head: Normocephalic and atraumatic. Neck: Normal range of motion. Neck supple. Cardiovascular: Normal rate and regular rhythm. Pulmonary/Chest: Effort normal and breath sounds normal.   Abdominal: Soft. Bowel sounds are normal. There is no tenderness. A hernia is present. Hernia confirmed positive in the left inguinal area. Genitourinary:       Left testis shows tenderness. Left testis shows no mass and no swelling. Neurological: He is alert and oriented to person, place, and time. Skin: Skin is warm and dry.    Psychiatric: He has a normal mood and affect. His behavior is normal.   Nursing note and vitals reviewed. ASSESSMENT and PLAN  Diagnoses and all orders for this visit:    1. Left inguinal hernia -- will have him seen by Surgery to discuss options.    -     REFERRAL TO SURGERY    2.  Benign essential HTN -- has some component of White coat hypertension as well      reviewed diet, exercise and weight control  reviewed medications and side effects in detail

## 2018-06-27 NOTE — MR AVS SNAPSHOT
[facility] 
 
 
 [address] [facility] [address] [facility] 
[phone] Patient: Dian Luz MRN: [id number] [facility]:0[birth year]75 Visit Information Date & Time Provider Department Dept. Phone Encounter #  
 6/27/2018 11:00 AM Alex Turner NP [facility] [address] [id number] Upcoming Health Maintenance Date Due  
 GLAUCOMA SCREENING Q2Y 9/9/2019* Pneumococcal 65+ Low/Medium Risk (2 of 2 - PPSV23) 7/7/2018 MEDICARE YEARLY EXAM 7/8/2018 Influenza Age 5 to Adult 8/1/2018 DTaP/Tdap/Td series (2 - Td) 7/7/2027 *Topic was postponed. The date shown is not the original due date. Allergies as of 6/27/2018  Review Complete On: 6/27/2018 By: Alex Turner NP Severity Noted Reaction Type Reactions Influenza Virus Vaccine, Specific  02/20/2017    Other (comments) \"made him sick\" Current Immunizations  Reviewed on 2/20/2017 No immunizations on file. Not reviewed this visit You Were Diagnosed With   
  
 Codes Comments Left inguinal hernia    -  Primary ICD-10-CM: K40.90 ICD-9-CM: 550.90 Benign essential HTN     ICD-10-CM: I10 
ICD-9-CM: 401.1 Vitals BP Pulse Temp Resp Height(growth percentile) Weight(growth percentile) (!) 140/100 (BP 1 Location: Right arm, BP Patient Position: Sitting) 62 97.6 °F (36.4 °C) (Oral) 12 6' 4.5\" (1.943 m) 264 lb (119.7 kg) SpO2 BMI Smoking Status 98% 31.72 kg/m2 Former Smoker Vitals History BMI and BSA Data Body Mass Index Body Surface Area 31.72 kg/m 2 2.54 m 2 Preferred Pharmacy Pharmacy Name Phone [id number], [address] [phone] Your Updated Medication List  
  
   
This list is accurate as of 6/27/18 11:35 AM.  Always use your most recent med list.  
  
  
  
  
 ASPIR-81 PO Take  by mouth.   
  
 IRON PO  
 Take  by mouth.  
  
 lisinopril 20 mg tablet Commonly known as:  Bushra Jomar Take 1 Tab by mouth daily. Increased dose 3/21/17 We Performed the Following REFERRAL TO SURGERY [KYH212 Custom] Comments:  
 Please evaluate patient for left inguinal hernia Referral Information Referral ID Referred By Referred To  
  
 [id number] Kiran Kulkarni MD   
   [address] [facility] [address] [place] [address] Phone: [phone] Fax: [phone] Visits Status Start Date End Date 1 New Request 6/27/18 6/27/19 If your referral has a status of pending review or denied, additional information will be sent to support the outcome of this decision. Patient Instructions Inguinal Hernia: Care Instructions Your Care Instructions An inguinal hernia occurs when tissue bulges through a weak spot in your groin area. You may see or feel a tender bulge in the groin or scrotum. You may also have pain, pressure or burning, or a feeling that something has \"given way. \" Hernias are caused by a weakness in the belly wall. The bulge or discomfort may occur after heavy lifting, straining, or coughing. Hernias do not heal on their own, and they tend to get worse over time. If your hernia does not bother you, you most likely can wait to have surgery. Your hernia may get worse, but it may not. In some cases, hernias that are small and painless may never need to be repaired. Follow-up care is a key part of your treatment and safety. Be sure to make and go to all appointments, and call your doctor if you are having problems. It's also a good idea to know your test results and keep a list of the medicines you take. How can you care for yourself at home? · Take pain medicines exactly as directed. ¨ If the doctor gave you a prescription medicine for pain, take it as prescribed. ¨ If you are not taking a prescription pain medicine, ask your doctor if you can take an over-the-counter medicine. · Use proper lifting techniques, and avoid heavy lifting if you can. To lift things more safely, bend your knees and let your arms and legs do the work. Keep your back straight, and do not bend over at the waist. Keep the load as close to your body as you can. Move your feet instead of turning or twisting your body. · Lose weight if you are overweight. · Include fruits, vegetables, legumes, and whole grains in your diet each day. These foods are high in fiber and will make it easier to avoid straining during bowel movements. · Do not smoke. Smoking can cause coughing, which can cause your hernia to bulge. If you need help quitting, talk to your doctor about stop-smoking programs and medicines. These can increase your chances of quitting for good. When should you call for help? Call your doctor now or seek immediate medical care if: 
? · You have new or worse belly pain. ? · You are vomiting. ? · You cannot pass stools or gas. ? · You cannot push the hernia back into place with gentle pressure when you are lying down. ? · The area over the hernia turns red or becomes tender. ? Watch closely for changes in your health, and be sure to contact your doctor if you have any problems. Where can you learn more? Go to http://tarun-camelia.info/. Enter W457 in the search box to learn more about \"Inguinal Hernia: Care Instructions. \" Current as of: May 12, 2017 Content Version: 11.4 © 8378-9186 Healthwise, Incorporated. Care instructions adapted under license by [employer] (which disclaims liability or warranty for this information). If you have questions about a medical condition or this instruction, always ask your healthcare professional. [address] any warranty or liability for your use of this information. Introducing [facility]! [employer] introduces New.net patient portal. Now you can access parts of your medical record, email your doctor's office, and request medication refills online. 1. In your internet browser, go to [email or web address] 2. Click on the First Time User? Click Here link in the Sign In box. You will see the New Member Sign Up page. 3. Enter your New.net Access Code exactly as it appears below. You will not need to use this code after youve completed the sign-up process. If you do not sign up before the expiration date, you must request a new code. · New.net Access Code: [email or web address] Expires: 9/25/2018 11:35 AM 
 
4. Enter the last four digits of your Social Security Number (xxxx) and Date of Birth (mm/dd/yyyy) as indicated and click Submit. You will be taken to the next sign-up page. 5. Create a New.net ID. This will be your New.net login ID and cannot be changed, so think of one that is secure and easy to remember. 6. Create a New.net password. You can change your password at any time. 7. Enter your Password Reset Question and Answer. This can be used at a later time if you forget your password. 8. Enter your e-mail address. You will receive e-mail notification when new information is available in [address] 9. Click Sign Up. You can now view and download portions of your medical record. 10. Click the Download Summary menu link to download a portable copy of your medical information. If you have questions, please visit the Frequently Asked Questions section of the New.net website. Remember, New.net is NOT to be used for urgent needs. For medical emergencies, dial 911. Now available from your iPhone and Android! Please provide this summary of care documentation to your next provider. Your primary care clinician is listed as Jennifer Ward. If you have any questions after today's visit, please call [phone].

## 2018-06-27 NOTE — TELEPHONE ENCOUNTER
Per Np pt needs appt with [facility] general surgery dr. Yesica Rodriguez or dr. Farzana Ha for left inguial hernia that is reoccurring. Pt request morning appt. Appt scheduled with Dr. Vashti Goldman for this Friday July 1st at 10am. They are faxing over new patient forms that they would like us to give to patient before they leave our office so pt can fill it out prior to his appt on Friday 29th. Please obtain a referral if needed.

## 2018-06-27 NOTE — PATIENT INSTRUCTIONS
Inguinal Hernia: Care Instructions  Your Care Instructions    An inguinal hernia occurs when tissue bulges through a weak spot in your groin area. You may see or feel a tender bulge in the groin or scrotum. You may also have pain, pressure or burning, or a feeling that something has \"given way. \"  Hernias are caused by a weakness in the belly wall. The bulge or discomfort may occur after heavy lifting, straining, or coughing. Hernias do not heal on their own, and they tend to get worse over time. If your hernia does not bother you, you most likely can wait to have surgery. Your hernia may get worse, but it may not. In some cases, hernias that are small and painless may never need to be repaired. Follow-up care is a key part of your treatment and safety. Be sure to make and go to all appointments, and call your doctor if you are having problems. It's also a good idea to know your test results and keep a list of the medicines you take. How can you care for yourself at home? · Take pain medicines exactly as directed. ¨ If the doctor gave you a prescription medicine for pain, take it as prescribed. ¨ If you are not taking a prescription pain medicine, ask your doctor if you can take an over-the-counter medicine. · Use proper lifting techniques, and avoid heavy lifting if you can. To lift things more safely, bend your knees and let your arms and legs do the work. Keep your back straight, and do not bend over at the waist. Keep the load as close to your body as you can. Move your feet instead of turning or twisting your body. · Lose weight if you are overweight. · Include fruits, vegetables, legumes, and whole grains in your diet each day. These foods are high in fiber and will make it easier to avoid straining during bowel movements. · Do not smoke. Smoking can cause coughing, which can cause your hernia to bulge. If you need help quitting, talk to your doctor about stop-smoking programs and medicines. These can increase your chances of quitting for good. When should you call for help? Call your doctor now or seek immediate medical care if:  ? · You have new or worse belly pain. ? · You are vomiting. ? · You cannot pass stools or gas. ? · You cannot push the hernia back into place with gentle pressure when you are lying down. ? · The area over the hernia turns red or becomes tender. ? Watch closely for changes in your health, and be sure to contact your doctor if you have any problems. Where can you learn more? Go to http://tarun-camelia.info/. Enter R407 in the search box to learn more about \"Inguinal Hernia: Care Instructions. \"  Current as of: May 12, 2017  Content Version: 11.4  © 8130-1291 Healthwise, Incorporated. Care instructions adapted under license by [employer] (which disclaims liability or warranty for this information). If you have questions about a medical condition or this instruction, always ask your healthcare professional. [place] [address] any warranty or liability for your use of this information.

## 2018-06-29 ENCOUNTER — OFFICE VISIT (OUTPATIENT)
Dept: SURGERY | Age: 83
End: 2018-06-29

## 2018-06-29 VITALS
HEART RATE: 59 BPM | RESPIRATION RATE: 14 BRPM | OXYGEN SATURATION: 98 % | WEIGHT: 260 LBS | TEMPERATURE: 97.5 F | DIASTOLIC BLOOD PRESSURE: 78 MMHG | BODY MASS INDEX: 30.7 KG/M2 | SYSTOLIC BLOOD PRESSURE: 158 MMHG | HEIGHT: 77 IN

## 2018-06-29 DIAGNOSIS — R10.32 LLQ PAIN: Primary | ICD-10-CM

## 2018-06-29 RX ORDER — OMEPRAZOLE 10 MG/1
10 CAPSULE, DELAYED RELEASE ORAL
Status: ON HOLD | COMMUNITY
End: 2018-09-20

## 2018-06-29 NOTE — PROGRESS NOTES
1. Have you been to the ER, urgent care clinic since your last visit? Hospitalized since your last visit?no    2. Have you seen or consulted any other health care providers outside of the [address] [place] [address] since your last visit? Include any pap smears or colon screening.  no

## 2018-07-03 ENCOUNTER — HOSPITAL ENCOUNTER (OUTPATIENT)
Dept: CT IMAGING | Age: 83
Discharge: HOME | End: 2018-07-03
Attending: SURGERY
Payer: MEDICARE

## 2018-07-03 ENCOUNTER — TELEPHONE (OUTPATIENT)
Dept: SURGERY | Age: 83
End: 2018-07-03

## 2018-07-03 DIAGNOSIS — N28.89 LEFT KIDNEY MASS: Primary | ICD-10-CM

## 2018-07-03 DIAGNOSIS — R10.32 LLQ PAIN: ICD-10-CM

## 2018-07-03 LAB — CREAT BLD-MCNC: 1 MG/DL (ref 0.6–1.3)

## 2018-07-03 PROCEDURE — 74011636320 HC RX REV CODE- 636/320: Performed by: RADIOLOGY

## 2018-07-03 PROCEDURE — 82565 ASSAY OF CREATININE: CPT

## 2018-07-03 PROCEDURE — 74178 CT ABD&PLV WO CNTR FLWD CNTR: CPT

## 2018-07-03 RX ADMIN — IOPAMIDOL 100 ML: 755 INJECTION, SOLUTION INTRAVENOUS at 13:31

## 2018-07-03 NOTE — PROGRESS NOTES
Consult    Subjective:     Oralia Kaiser is a [de-identified] y.o.  male who is being seen for LLQ pain. Onset of symptoms was abrupt a couple of weeks ago with gradually improving course since that time. The pain is located LLQ. Patient describes the pain as a dull ache and rated as mild. Pain has been associated with nothing. Patient denies fever, chills, nausea, bloating, change in bowel habits and dysuria. Symptoms are aggravated by activity. Symptoms improve with nothing. Previous studies include none. Past Medical History:   Diagnosis Date    Arthritis     Benign essential HTN     took medication    GERD (gastroesophageal reflux disease)     Hernia     Iron deficiency anemia     Dr. Reji Mandel. 2014. on Xarelto. did iron infusions.  Pulmonary emboli [facility]) 2014    Dr. Maryanne Chaudhry. Xarelto caused anemia    White coat syndrome with hypertension 2/15/2018      Past Surgical History:   Procedure Laterality Date    HX COLONOSCOPY  2014    no source of bleeding    HX ENDOSCOPY  2014    no sournce of bleeding    HX ORTHOPAEDIC      trigger fingers both pinky     Family History   Problem Relation Age of Onset    Cancer Sister     Cancer Brother       Social History   Substance Use Topics    Smoking status: Former Smoker    Smokeless tobacco: Never Used    Alcohol use Yes      Comment: rare       Current Outpatient Prescriptions   Medication Sig Dispense Refill    omeprazole (PRILOSEC) 10 mg capsule Take 10 mg by mouth daily.  lisinopril (PRINIVIL, ZESTRIL) 20 mg tablet Take 1 Tab by mouth daily. Increased dose 3/21/17 90 Tab 1    ASPIRIN (ASPIR-81 PO) Take  by mouth.  FERROUS SULFATE (IRON PO) Take  by mouth. Allergies   Allergen Reactions    Influenza Virus Vaccine, Specific Other (comments)     \"made him sick\"        Review of Systems:  A comprehensive review of systems was negative except for that written in the History of Present Illness.      Objective:       Physical Exam: Visit Vitals    /78 (BP 1 Location: Left arm, BP Patient Position: Sitting)    Pulse (!) 59    Temp 97.5 °F (36.4 °C) (Oral)    Resp 14    Ht 6' 4.5\" (1.943 m)    Wt 260 lb (117.9 kg)    SpO2 98%    BMI 31.24 kg/m2     General appearance: alert, cooperative, no distress, appears stated age  Head: Normocephalic, without obvious abnormality, atraumatic  Eyes: conjunctivae/corneas clear. PERRL, EOM's intact. Fundi benign  Abdomen: soft, non-tender. Bowel sounds normal. No masses,  no organomegaly  :  No palpable LIH even with valsalva      Assessment:     LLQ pain of unclear etiology.  Will get CT scan to assess    Plan:     CT abd/pelvis    Signed By: Brielle Middleton MD     July 2, 2018

## 2018-07-05 NOTE — TELEPHONE ENCOUNTER
Called patient and explained CT findings of suspicious left kidney mass. Also explained no hernia.   Referral made to urology

## 2018-07-09 PROBLEM — N28.89 LEFT RENAL MASS: Status: ACTIVE | Noted: 2018-07-01

## 2018-09-13 ENCOUNTER — HOSPITAL ENCOUNTER (OUTPATIENT)
Dept: GENERAL RADIOLOGY | Age: 83
Discharge: HOME | End: 2018-09-13
Attending: UROLOGY
Payer: MEDICARE

## 2018-09-13 ENCOUNTER — HOSPITAL ENCOUNTER (OUTPATIENT)
Dept: PREADMISSION TESTING | Age: 83
Discharge: HOME | End: 2018-09-13
Payer: MEDICARE

## 2018-09-13 VITALS
DIASTOLIC BLOOD PRESSURE: 106 MMHG | TEMPERATURE: 98.6 F | OXYGEN SATURATION: 97 % | SYSTOLIC BLOOD PRESSURE: 186 MMHG | HEIGHT: 77 IN | HEART RATE: 87 BPM | BODY MASS INDEX: 30.58 KG/M2 | WEIGHT: 259 LBS | RESPIRATION RATE: 20 BRPM

## 2018-09-13 LAB
ABO + RH BLD: NORMAL
ALBUMIN SERPL-MCNC: 3.6 G/DL (ref 3.5–5)
ALBUMIN/GLOB SERPL: 1 {RATIO} (ref 1.1–2.2)
ALP SERPL-CCNC: 105 U/L (ref 45–117)
ALT SERPL-CCNC: 26 U/L (ref 12–78)
ANION GAP SERPL CALC-SCNC: 6 MMOL/L (ref 5–15)
APPEARANCE UR: CLEAR
AST SERPL-CCNC: 18 U/L (ref 15–37)
ATRIAL RATE: 91 BPM
BACTERIA URNS QL MICRO: NEGATIVE /HPF
BASOPHILS # BLD: 0.1 K/UL (ref 0–0.1)
BASOPHILS NFR BLD: 1 % (ref 0–1)
BILIRUB SERPL-MCNC: 0.4 MG/DL (ref 0.2–1)
BILIRUB UR QL: NEGATIVE
BLOOD GROUP ANTIBODIES SERPL: NORMAL
BUN SERPL-MCNC: 17 MG/DL (ref 6–20)
BUN/CREAT SERPL: 16 (ref 12–20)
CALCIUM SERPL-MCNC: 9.4 MG/DL (ref 8.5–10.1)
CALCULATED P AXIS, ECG09: 70 DEGREES
CALCULATED R AXIS, ECG10: -44 DEGREES
CALCULATED T AXIS, ECG11: 46 DEGREES
CHLORIDE SERPL-SCNC: 106 MMOL/L (ref 97–108)
CO2 SERPL-SCNC: 29 MMOL/L (ref 21–32)
COLOR UR: NORMAL
CREAT SERPL-MCNC: 1.06 MG/DL (ref 0.7–1.3)
DIAGNOSIS, 93000: NORMAL
DIFFERENTIAL METHOD BLD: NORMAL
EOSINOPHIL # BLD: 0.1 K/UL (ref 0–0.4)
EOSINOPHIL NFR BLD: 2 % (ref 0–7)
EPITH CASTS URNS QL MICRO: NORMAL /LPF
ERYTHROCYTE [DISTWIDTH] IN BLOOD BY AUTOMATED COUNT: 12.9 % (ref 11.5–14.5)
GLOBULIN SER CALC-MCNC: 3.6 G/DL (ref 2–4)
GLUCOSE SERPL-MCNC: 88 MG/DL (ref 65–100)
GLUCOSE UR STRIP.AUTO-MCNC: NEGATIVE MG/DL
HCT VFR BLD AUTO: 44.4 % (ref 36.6–50.3)
HGB BLD-MCNC: 14 G/DL (ref 12.1–17)
HGB UR QL STRIP: NEGATIVE
HYALINE CASTS URNS QL MICRO: NORMAL /LPF (ref 0–5)
IMM GRANULOCYTES # BLD: 0 K/UL (ref 0–0.04)
IMM GRANULOCYTES NFR BLD AUTO: 0 % (ref 0–0.5)
KETONES UR QL STRIP.AUTO: NEGATIVE MG/DL
LEUKOCYTE ESTERASE UR QL STRIP.AUTO: NEGATIVE
LYMPHOCYTES # BLD: 1.2 K/UL (ref 0.8–3.5)
LYMPHOCYTES NFR BLD: 21 % (ref 12–49)
MCH RBC QN AUTO: 30.6 PG (ref 26–34)
MCHC RBC AUTO-ENTMCNC: 31.5 G/DL (ref 30–36.5)
MCV RBC AUTO: 96.9 FL (ref 80–99)
MONOCYTES # BLD: 0.5 K/UL (ref 0–1)
MONOCYTES NFR BLD: 9 % (ref 5–13)
NEUTS SEG # BLD: 4 K/UL (ref 1.8–8)
NEUTS SEG NFR BLD: 68 % (ref 32–75)
NITRITE UR QL STRIP.AUTO: NEGATIVE
NRBC # BLD: 0 K/UL (ref 0–0.01)
NRBC BLD-RTO: 0 PER 100 WBC
P-R INTERVAL, ECG05: 202 MS
PH UR STRIP: 5.5 [PH] (ref 5–8)
PLATELET # BLD AUTO: 241 K/UL (ref 150–400)
PMV BLD AUTO: 10.6 FL (ref 8.9–12.9)
POTASSIUM SERPL-SCNC: 5.2 MMOL/L (ref 3.5–5.1)
PROT SERPL-MCNC: 7.2 G/DL (ref 6.4–8.2)
PROT UR STRIP-MCNC: NEGATIVE MG/DL
Q-T INTERVAL, ECG07: 370 MS
QRS DURATION, ECG06: 114 MS
QTC CALCULATION (BEZET), ECG08: 424 MS
RBC # BLD AUTO: 4.58 M/UL (ref 4.1–5.7)
RBC #/AREA URNS HPF: NORMAL /HPF (ref 0–5)
SODIUM SERPL-SCNC: 141 MMOL/L (ref 136–145)
SP GR UR REFRACTOMETRY: 1.01 (ref 1–1.03)
SPECIMEN EXP DATE BLD: NORMAL
UA: UC IF INDICATED,UAUC: NORMAL
UROBILINOGEN UR QL STRIP.AUTO: 0.2 EU/DL (ref 0.2–1)
VENTRICULAR RATE, ECG03: 79 BPM
WBC # BLD AUTO: 5.9 K/UL (ref 4.1–11.1)
WBC URNS QL MICRO: NORMAL /HPF (ref 0–4)

## 2018-09-13 PROCEDURE — 71046 X-RAY EXAM CHEST 2 VIEWS: CPT

## 2018-09-13 PROCEDURE — 86900 BLOOD TYPING SEROLOGIC ABO: CPT | Performed by: UROLOGY

## 2018-09-13 PROCEDURE — 81001 URINALYSIS AUTO W/SCOPE: CPT | Performed by: UROLOGY

## 2018-09-13 PROCEDURE — 85025 COMPLETE CBC W/AUTO DIFF WBC: CPT | Performed by: UROLOGY

## 2018-09-13 PROCEDURE — 93005 ELECTROCARDIOGRAM TRACING: CPT

## 2018-09-13 PROCEDURE — 80053 COMPREHEN METABOLIC PANEL: CPT | Performed by: UROLOGY

## 2018-09-13 PROCEDURE — 36415 COLL VENOUS BLD VENIPUNCTURE: CPT | Performed by: UROLOGY

## 2018-09-13 NOTE — H&P
[facility] Pre-Op History & Physical    Patient: Raulito Hill                  MRN: [id number]          SSN: [id number]  YOB: 1935          Age: 82 y.o. Sex: male                Subjective:   Patient is a 82 y.o.  male who presents with history of left kidney mass. Patient has complained of left inguinal pain and was referred by his PCP's office to general surgeon ( Dr Kalin Bravo) for evaluation. CT showed left renal mass. Patient states he still has rare discomfort in his left groin- rates pain 1/10 and describes it as a dull ache. States that he notices the pain most when he picks up a heavy object. The patient was evaluated in the surgeon's office and it was determined that the most appropriate plan of care is to proceed with surgical intervention. Patient's PCP Kayleen Nance MD    Patient has severe white coat HTN- BP elevated in [facility] but was 122/68 at home this morning per patient report. Past Medical History:   Diagnosis Date    Arthritis     Benign essential HTN     took medication    GERD (gastroesophageal reflux disease)     occasional r/t food intake    Hernia     Iron deficiency anemia     Dr. Nancy Mckeon. 2014. on Xarelto. did iron infusions.  Left renal mass 07/2018    Pulmonary emboli [facility]) 2014    Dr. Vanessa Duenas. Xarelto caused anemia    White coat syndrome with hypertension 2/15/2018      Past Surgical History:   Procedure Laterality Date    HX COLONOSCOPY  2014    no source of bleeding    HX ENDOSCOPY  2014    no sournce of bleeding    HX ORTHOPAEDIC      trigger fingers both pinky      Prior to Admission medications    Medication Sig Start Date End Date Taking? Authorizing Provider   lisinopril (PRINIVIL, ZESTRIL) 20 mg tablet TAKE 1 TABLET BY MOUTH DAILY. 8/7/18  Yes Kayleen Nance MD   omeprazole (PRILOSEC) 10 mg capsule Take 10 mg by mouth daily as needed. Yes Historical Provider   ASPIRIN (ASPIR-81 PO) Take  by mouth daily.    Yes Historical Provider     Current Outpatient Prescriptions   Medication Sig    lisinopril (PRINIVIL, ZESTRIL) 20 mg tablet TAKE 1 TABLET BY MOUTH DAILY.  omeprazole (PRILOSEC) 10 mg capsule Take 10 mg by mouth daily as needed.  ASPIRIN (ASPIR-81 PO) Take  by mouth daily. No current facility-administered medications for this encounter. Allergies   Allergen Reactions    Influenza Virus Vaccine, Specific Other (comments)     \"made him sick\"      Social History   Substance Use Topics    Smoking status: Former Smoker     Packs/day: 1.00     Years: 5.00     Quit date:     Smokeless tobacco: Never Used    Alcohol use 1.8 oz/week     3 Cans of beer per week      History   Drug Use No     Family History   Problem Relation Age of Onset    Stroke Sister     Cancer Brother      growth on kidney    No Known Problems Mother     No Known Problems Father     No Known Problems Sister          Review of Systems    Patient denies difficulty swallowing, mouth sores, or loose teeth. Patient denies any recent dental procedures in last christopher or any planned prior to surgery. Patient denies chest pain, tightness, pain radiating down left arm, palpitations. Denies dizziness, visual disturbances, or lightheadedness. Patient denies shortness of breath, wheezing, cough, fever, or chills. Patient denies diarrhea, constipation, or abdominal pain. Patient denies urinary problems including dysuria, hesitancy, urgency, or incontinence. Denies skin breakdown, rashes, insect bites or open area. Objective:   Patient Vitals for the past 24 hrs:   Temp Pulse Resp BP SpO2   18 1059 98.6 °F (37 °C) 87 20 (!) 186/106 97 %     Temp (24hrs), Av.6 °F (37 °C), Min:98.6 °F (37 °C), Max:98.6 °F (37 °C)    Body mass index is 31.12 kg/(m^2). Wt Readings from Last 1 Encounters:   18 117.5 kg (259 lb)        Physical Exam:     General: Pleasant,  cooperative, no apparent distress, appears younger than stated age.    Eyes: Conjunctivae/corneas clear. EOMs intact. Nose: Nares normal.   Mouth/Throat: Lips, mucosa, and tongue normal. Teeth and gums normal.   Neck: Supple, symmetrical, trachea midline. Back: Symmetric   Lungs: Clear to auscultation bilaterally. Heart: Slightly iregular rate and rhythm. S1, S2 normal. No murmur, click, rub or gallop. Abdomen: Soft, non-tender. Bowel sounds normal. No distention. Musculoskeletal:  Unremarkable. Extremities:  Extremities normal, atraumatic, no cyanosis or edema. Calves                                 supple, non tender to palpation. Pulses: 2+ and symmetric bilateral upper extremities. Cap. refill <2 seconds   Skin: Skin color, texture, turgor normal.  No visible rashes or lesions. Neurologic: CN II-XII grossly intact. Alert and oriented x3. Labs:   Recent Results (from the past 72 hour(s))   CBC WITH AUTOMATED DIFF    Collection Time: 09/13/18 10:14 AM   Result Value Ref Range    WBC 5.9 4.1 - 11.1 K/uL    RBC 4.58 4.10 - 5.70 M/uL    HGB 14.0 12.1 - 17.0 g/dL    HCT 44.4 36.6 - 50.3 %    MCV 96.9 80.0 - 99.0 FL    MCH 30.6 26.0 - 34.0 PG    MCHC 31.5 30.0 - 36.5 g/dL    RDW 12.9 11.5 - 14.5 %    PLATELET 334 789 - 607 K/uL    MPV 10.6 8.9 - 12.9 FL    NRBC 0.0 0  WBC    ABSOLUTE NRBC 0.00 0.00 - 0.01 K/uL    NEUTROPHILS 68 32 - 75 %    LYMPHOCYTES 21 12 - 49 %    MONOCYTES 9 5 - 13 %    EOSINOPHILS 2 0 - 7 %    BASOPHILS 1 0 - 1 %    IMMATURE GRANULOCYTES 0 0.0 - 0.5 %    ABS. NEUTROPHILS 4.0 1.8 - 8.0 K/UL    ABS. LYMPHOCYTES 1.2 0.8 - 3.5 K/UL    ABS. MONOCYTES 0.5 0.0 - 1.0 K/UL    ABS. EOSINOPHILS 0.1 0.0 - 0.4 K/UL    ABS. BASOPHILS 0.1 0.0 - 0.1 K/UL    ABS. IMM.  GRANS. 0.0 0.00 - 0.04 K/UL    DF AUTOMATED     METABOLIC PANEL, COMPREHENSIVE    Collection Time: 09/13/18 10:14 AM   Result Value Ref Range    Sodium 141 136 - 145 mmol/L    Potassium 5.2 (H) 3.5 - 5.1 mmol/L    Chloride 106 97 - 108 mmol/L    CO2 29 21 - 32 mmol/L    Anion gap 6 5 - 15 mmol/L    Glucose 88 65 - 100 mg/dL    BUN 17 6 - 20 MG/DL    Creatinine 1.06 0.70 - 1.30 MG/DL    BUN/Creatinine ratio 16 12 - 20      GFR est AA >60 >60 ml/min/1.73m2    GFR est non-AA >60 >60 ml/min/1.73m2    Calcium 9.4 8.5 - 10.1 MG/DL    Bilirubin, total 0.4 0.2 - 1.0 MG/DL    ALT (SGPT) 26 12 - 78 U/L    AST (SGOT) 18 15 - 37 U/L    Alk.  phosphatase 105 45 - 117 U/L    Protein, total 7.2 6.4 - 8.2 g/dL    Albumin 3.6 3.5 - 5.0 g/dL    Globulin 3.6 2.0 - 4.0 g/dL    A-G Ratio 1.0 (L) 1.1 - 2.2     URINALYSIS W/ REFLEX CULTURE    Collection Time: 09/13/18 10:14 AM   Result Value Ref Range    Color YELLOW/STRAW      Appearance CLEAR CLEAR      Specific gravity 1.014 1.003 - 1.030      pH (UA) 5.5 5.0 - 8.0      Protein NEGATIVE  NEG mg/dL    Glucose NEGATIVE  NEG mg/dL    Ketone NEGATIVE  NEG mg/dL    Bilirubin NEGATIVE  NEG      Blood NEGATIVE  NEG      Urobilinogen 0.2 0.2 - 1.0 EU/dL    Nitrites NEGATIVE  NEG      Leukocyte Esterase NEGATIVE  NEG      WBC 0-4 0 - 4 /hpf    RBC 0-5 0 - 5 /hpf    Epithelial cells FEW FEW /lpf    Bacteria NEGATIVE  NEG /hpf    UA:UC IF INDICATED CULTURE NOT INDICATED BY UA RESULT CNI      Hyaline cast 0-2 0 - 5 /lpf   EKG, 12 LEAD, INITIAL    Collection Time: 09/13/18 10:28 AM   Result Value Ref Range    Ventricular Rate 79 BPM    Atrial Rate 91 BPM    P-R Interval 202 ms    QRS Duration 114 ms    Q-T Interval 370 ms    QTC Calculation (Bezet) 424 ms    Calculated P Axis 70 degrees    Calculated R Axis -44 degrees    Calculated T Axis 46 degrees    Diagnosis       Sinus rhythm with marked sinus arrhythmia with occasional premature   ventricular complexes  Left axis deviation  Moderate voltage criteria for LVH, may be normal variant  Abnormal ECG  No previous ECGs available       Radiology:    Final result (Exam End: 9/13/2018 10:38 AM) Open    Study Result   CLINICAL HISTORY: Preop  INDICATION: Preop, anemia, GERD, hernia     COMPARISON: None     FINDINGS:   PA and lateral views of the chest are obtained. The cardiopericardial silhouette is within normal limits. There is no pleural  effusion, pneumothorax or focal consolidation present.     IMPRESSION  IMPRESSION: No acute intrathoracic disease. Assessment:     Left renal mass/cyst.    Plan:     Scheduled for laparoscopic left partial nephrectomy. Labs, CXR,  and EKG done per surgeon's orders. Lab results and CXR reviewed- unremarkable. EKG interpretation pending.       Octavio Canseco NP

## 2018-09-13 NOTE — PERIOP NOTES
Patient states he takes his BP at home daily and no BP elevation. Patient denies any dizziness,CP or headaches.

## 2018-09-13 NOTE — PERIOP NOTES
[facility] [address]    MAIN OR [phone]    MAIN PRE OP [phone]    AMBULATORY PRE OP [phone]    PRE-ADMISSION TESTING [phone]       Surgery Date:   9/20/18        Is surgery arrival time given by surgeon? NO  If NO, [facility] staff will call you between 3 and 7pm the day before your surgery with your arrival time. (If your surgery is on a Monday, we will call you the Friday before.)    Call [phone] after 7pm Monday-Friday if you did not receive your arrival time.     Answers to Common Questions   When You  Arrive   Arrive at the [facility] [address] on the day of your surgery  Have your insurance card, photo ID, and any copayment (if needed)     Food   and   Drink   NO food or drink after midnight the night before surgery    This means NO water, gum, mints, coffee, juice, etc.  No alcohol (beer, wine, liquor) 24 hours before and after surgery     Medicine to   TAKE   Morning of Surgery   MEDICATIONS TO TAKE THE MORNING OF SURGERY WITH A SIP OF WATER:    none     Medicine  To  STOP   FOR PAIN   You can take Tylenol - follow instructions on the bottle   NO Aspirin for pain    NO Non-Steroidal Anti-Inflammatory Drugs (NSAIDs:   for example, Ibuprofen (Advil, Motrin), Naproxen (Aleve)   STOP herbal supplements and vitamins 1 week before surgery     Blood  Thinners    If you take Aspirin, Plavix, Coumadin, blood-thinning or anti-clot medicine, talk to your surgeon and/or follow the instructions from the doctor who told you to take that medicine     Clothing  Jewelry  Valuables  Bathing     CLOTHING   Wear loose, comfortable clothes   Wear glasses instead of contacts   Leave money, jewelry and valuables at home   No make-up, particularly mascara, the day of surgery   REMOVE ALL piercings, rings, and jewelry - leave at home   Wear hair loose or down; no pony-tails, buns, or metal hair clips    BATHING   Follow all special bath instructions (for total joint replacement, spine and bowel surgeries.)   If you shower the morning of surgery, please do not apply any lotions, powders, or deodorants afterwards. Do not shave or trim anywhere 24 hours before surgery. Going Home  or  Spending the Night    SAME-DAY SURGERY: You must have a responsible adult drive you home and stay with you 24 hours after surgery   ADMITS: If your doctor is keeping you into the hospital after surgery, leave personal belongings/luggage in your car until you have a hospital room number. Hospital discharge time is 12 noon  Drivers must be here before 12 noon unless you are told differently         Follow all instructions so your surgery wont be cancelled. Please, be on time. If a situation occurs and you are delayed the day of surgery, call ( [phone]. If your physical condition changes (like a fever, cold, flu, etc.) call your surgeon as soon as possible. The [facility] staff can be reached at [phone]. OTHER SPECIAL INSTRUCTIONS:  Free  parking,Bring completed PTA medication list with you on the day of your surgery. The patient was contacted  in person. He  verbalize  understanding of all instructions and does not  need reinforcement.

## 2018-09-19 ENCOUNTER — ANESTHESIA EVENT (OUTPATIENT)
Dept: SURGERY | Age: 83
DRG: 658 | End: 2018-09-19
Payer: MEDICARE

## 2018-09-20 ENCOUNTER — HOSPITAL ENCOUNTER (INPATIENT)
Age: 83
LOS: 1 days | Discharge: HOME | DRG: 658 | End: 2018-09-21
Attending: UROLOGY | Admitting: UROLOGY
Payer: MEDICARE

## 2018-09-20 ENCOUNTER — ANESTHESIA (OUTPATIENT)
Dept: SURGERY | Age: 83
DRG: 658 | End: 2018-09-20
Payer: MEDICARE

## 2018-09-20 DIAGNOSIS — N28.89 RENAL MASS, LEFT: Primary | ICD-10-CM

## 2018-09-20 PROCEDURE — 77030011640 HC PAD GRND REM COVD -A: Performed by: UROLOGY

## 2018-09-20 PROCEDURE — 77030016151 HC PROTCTR LNS DFOG COVD -B: Performed by: UROLOGY

## 2018-09-20 PROCEDURE — 74011250636 HC RX REV CODE- 250/636

## 2018-09-20 PROCEDURE — 77030008557 HC TBNG SMK EVAC STOR -B: Performed by: UROLOGY

## 2018-09-20 PROCEDURE — 77030009527 HC GEL PRT SYS AMR -E: Performed by: UROLOGY

## 2018-09-20 PROCEDURE — 88307 TISSUE EXAM BY PATHOLOGIST: CPT | Performed by: UROLOGY

## 2018-09-20 PROCEDURE — 77030018673: Performed by: UROLOGY

## 2018-09-20 PROCEDURE — 77030002966 HC SUT PDS J&J -A: Performed by: UROLOGY

## 2018-09-20 PROCEDURE — 65270000029 HC RM PRIVATE

## 2018-09-20 PROCEDURE — 74011258636 HC RX REV CODE- 258/636: Performed by: UROLOGY

## 2018-09-20 PROCEDURE — 77030010935 HC CLP LIG ABSRB TELE -B: Performed by: UROLOGY

## 2018-09-20 PROCEDURE — 74011250636 HC RX REV CODE- 250/636: Performed by: ANESTHESIOLOGY

## 2018-09-20 PROCEDURE — 77030008756 HC TU IRR SUC STRY -B: Performed by: UROLOGY

## 2018-09-20 PROCEDURE — 77030008684 HC TU ET CUF COVD -B: Performed by: ANESTHESIOLOGY

## 2018-09-20 PROCEDURE — 74011250636 HC RX REV CODE- 250/636: Performed by: UROLOGY

## 2018-09-20 PROCEDURE — 76060000035 HC ANESTHESIA 2 TO 2.5 HR: Performed by: UROLOGY

## 2018-09-20 PROCEDURE — 77030020782 HC GWN BAIR PAWS FLX 3M -B

## 2018-09-20 PROCEDURE — 77030032490 HC SLV COMPR SCD KNE COVD -B

## 2018-09-20 PROCEDURE — 77030002933 HC SUT MCRYL J&J -A: Performed by: UROLOGY

## 2018-09-20 PROCEDURE — 77030011810 HC STPLR ENDOSC J&J -G: Performed by: UROLOGY

## 2018-09-20 PROCEDURE — 0TT14ZZ RESECTION OF LEFT KIDNEY, PERCUTANEOUS ENDOSCOPIC APPROACH: ICD-10-PCS | Performed by: UROLOGY

## 2018-09-20 PROCEDURE — 74011250637 HC RX REV CODE- 250/637: Performed by: UROLOGY

## 2018-09-20 PROCEDURE — 77030014008 HC SPNG HEMSTAT J&J -C: Performed by: UROLOGY

## 2018-09-20 PROCEDURE — 74011000250 HC RX REV CODE- 250

## 2018-09-20 PROCEDURE — 76210000006 HC OR PH I REC 0.5 TO 1 HR: Performed by: UROLOGY

## 2018-09-20 PROCEDURE — 77030027138 HC INCENT SPIROMETER -A

## 2018-09-20 PROCEDURE — 77030026438 HC STYL ET INTUB CARD -A: Performed by: ANESTHESIOLOGY

## 2018-09-20 PROCEDURE — 77030008771 HC TU NG SALEM SUMP -A: Performed by: ANESTHESIOLOGY

## 2018-09-20 PROCEDURE — 77030013079 HC BLNKT BAIR HGGR 3M -A: Performed by: ANESTHESIOLOGY

## 2018-09-20 PROCEDURE — 77030035045 HC TRCR ENDOSC VRSPRT BLDLSS COVD -B: Performed by: UROLOGY

## 2018-09-20 PROCEDURE — 77030018836 HC SOL IRR NACL ICUM -A: Performed by: UROLOGY

## 2018-09-20 PROCEDURE — 76010000171 HC OR TIME 2 TO 2.5 HR INTENSV-TIER 1: Performed by: UROLOGY

## 2018-09-20 PROCEDURE — 77030034850: Performed by: UROLOGY

## 2018-09-20 PROCEDURE — 77030037032 HC INSRT SCIS CLICKLLINE DISP STOR -B: Performed by: UROLOGY

## 2018-09-20 PROCEDURE — 77030031139 HC SUT VCRL2 J&J -A: Performed by: UROLOGY

## 2018-09-20 PROCEDURE — 77030019908 HC STETH ESOPH SIMS -A: Performed by: ANESTHESIOLOGY

## 2018-09-20 PROCEDURE — 77030002916 HC SUT ETHLN J&J -A: Performed by: UROLOGY

## 2018-09-20 PROCEDURE — 77030039266 HC ADH SKN EXOFIN S2SG -A: Performed by: UROLOGY

## 2018-09-20 PROCEDURE — 77030034628 HC LIGASURE LAP SEAL DIV MD COVD -F: Performed by: UROLOGY

## 2018-09-20 PROCEDURE — 74011000250 HC RX REV CODE- 250: Performed by: UROLOGY

## 2018-09-20 PROCEDURE — 77030018004 HC RELD STPLR ENDOSC1 J&J -C: Performed by: UROLOGY

## 2018-09-20 RX ORDER — DEXAMETHASONE SODIUM PHOSPHATE 4 MG/ML
INJECTION, SOLUTION INTRA-ARTICULAR; INTRALESIONAL; INTRAMUSCULAR; INTRAVENOUS; SOFT TISSUE AS NEEDED
Status: DISCONTINUED | OUTPATIENT
Start: 2018-09-20 | End: 2018-09-20 | Stop reason: HOSPADM

## 2018-09-20 RX ORDER — SODIUM CHLORIDE, SODIUM LACTATE, POTASSIUM CHLORIDE, CALCIUM CHLORIDE 600; 310; 30; 20 MG/100ML; MG/100ML; MG/100ML; MG/100ML
125 INJECTION, SOLUTION INTRAVENOUS CONTINUOUS
Status: DISCONTINUED | OUTPATIENT
Start: 2018-09-20 | End: 2018-09-20 | Stop reason: HOSPADM

## 2018-09-20 RX ORDER — SODIUM CHLORIDE 0.9 % (FLUSH) 0.9 %
5-10 SYRINGE (ML) INJECTION AS NEEDED
Status: DISCONTINUED | OUTPATIENT
Start: 2018-09-20 | End: 2018-09-20 | Stop reason: HOSPADM

## 2018-09-20 RX ORDER — ZOLPIDEM TARTRATE 5 MG/1
5 TABLET ORAL
Status: DISCONTINUED | OUTPATIENT
Start: 2018-09-20 | End: 2018-09-21 | Stop reason: HOSPADM

## 2018-09-20 RX ORDER — ONDANSETRON 2 MG/ML
4 INJECTION INTRAMUSCULAR; INTRAVENOUS AS NEEDED
Status: DISCONTINUED | OUTPATIENT
Start: 2018-09-20 | End: 2018-09-20 | Stop reason: HOSPADM

## 2018-09-20 RX ORDER — ONDANSETRON 2 MG/ML
4 INJECTION INTRAMUSCULAR; INTRAVENOUS
Status: DISCONTINUED | OUTPATIENT
Start: 2018-09-20 | End: 2018-09-21 | Stop reason: HOSPADM

## 2018-09-20 RX ORDER — HYDROMORPHONE HYDROCHLORIDE 2 MG/ML
INJECTION, SOLUTION INTRAMUSCULAR; INTRAVENOUS; SUBCUTANEOUS AS NEEDED
Status: DISCONTINUED | OUTPATIENT
Start: 2018-09-20 | End: 2018-09-20 | Stop reason: HOSPADM

## 2018-09-20 RX ORDER — ONDANSETRON 2 MG/ML
INJECTION INTRAMUSCULAR; INTRAVENOUS AS NEEDED
Status: DISCONTINUED | OUTPATIENT
Start: 2018-09-20 | End: 2018-09-20 | Stop reason: HOSPADM

## 2018-09-20 RX ORDER — LIDOCAINE HYDROCHLORIDE 10 MG/ML
0.1 INJECTION, SOLUTION EPIDURAL; INFILTRATION; INTRACAUDAL; PERINEURAL AS NEEDED
Status: DISCONTINUED | OUTPATIENT
Start: 2018-09-20 | End: 2018-09-20 | Stop reason: HOSPADM

## 2018-09-20 RX ORDER — GLYCOPYRROLATE 0.2 MG/ML
INJECTION INTRAMUSCULAR; INTRAVENOUS AS NEEDED
Status: DISCONTINUED | OUTPATIENT
Start: 2018-09-20 | End: 2018-09-20 | Stop reason: HOSPADM

## 2018-09-20 RX ORDER — SODIUM CHLORIDE 0.9 % (FLUSH) 0.9 %
5-10 SYRINGE (ML) INJECTION EVERY 8 HOURS
Status: DISCONTINUED | OUTPATIENT
Start: 2018-09-20 | End: 2018-09-21 | Stop reason: HOSPADM

## 2018-09-20 RX ORDER — MORPHINE SULFATE 4 MG/ML
2 INJECTION, SOLUTION INTRAMUSCULAR; INTRAVENOUS
Status: DISCONTINUED | OUTPATIENT
Start: 2018-09-20 | End: 2018-09-21 | Stop reason: HOSPADM

## 2018-09-20 RX ORDER — DEXTROSE, SODIUM CHLORIDE, SODIUM LACTATE, POTASSIUM CHLORIDE, AND CALCIUM CHLORIDE 5; .6; .31; .03; .02 G/100ML; G/100ML; G/100ML; G/100ML; G/100ML
150 INJECTION, SOLUTION INTRAVENOUS CONTINUOUS
Status: DISCONTINUED | OUTPATIENT
Start: 2018-09-20 | End: 2018-09-21

## 2018-09-20 RX ORDER — FENTANYL CITRATE 50 UG/ML
INJECTION, SOLUTION INTRAMUSCULAR; INTRAVENOUS AS NEEDED
Status: DISCONTINUED | OUTPATIENT
Start: 2018-09-20 | End: 2018-09-20 | Stop reason: HOSPADM

## 2018-09-20 RX ORDER — LIDOCAINE HYDROCHLORIDE 20 MG/ML
INJECTION, SOLUTION EPIDURAL; INFILTRATION; INTRACAUDAL; PERINEURAL AS NEEDED
Status: DISCONTINUED | OUTPATIENT
Start: 2018-09-20 | End: 2018-09-20 | Stop reason: HOSPADM

## 2018-09-20 RX ORDER — ROCURONIUM BROMIDE 10 MG/ML
INJECTION, SOLUTION INTRAVENOUS AS NEEDED
Status: DISCONTINUED | OUTPATIENT
Start: 2018-09-20 | End: 2018-09-20 | Stop reason: HOSPADM

## 2018-09-20 RX ORDER — SUCCINYLCHOLINE CHLORIDE 20 MG/ML
INJECTION INTRAMUSCULAR; INTRAVENOUS AS NEEDED
Status: DISCONTINUED | OUTPATIENT
Start: 2018-09-20 | End: 2018-09-20 | Stop reason: HOSPADM

## 2018-09-20 RX ORDER — CEFAZOLIN SODIUM/WATER 2 G/20 ML
2 SYRINGE (ML) INTRAVENOUS ONCE
Status: COMPLETED | OUTPATIENT
Start: 2018-09-20 | End: 2018-09-20

## 2018-09-20 RX ORDER — DIPHENHYDRAMINE HYDROCHLORIDE 50 MG/ML
12.5 INJECTION, SOLUTION INTRAMUSCULAR; INTRAVENOUS
Status: DISCONTINUED | OUTPATIENT
Start: 2018-09-20 | End: 2018-09-21 | Stop reason: HOSPADM

## 2018-09-20 RX ORDER — SODIUM CHLORIDE 0.9 % (FLUSH) 0.9 %
5-10 SYRINGE (ML) INJECTION EVERY 8 HOURS
Status: DISCONTINUED | OUTPATIENT
Start: 2018-09-20 | End: 2018-09-20 | Stop reason: HOSPADM

## 2018-09-20 RX ORDER — ACETAMINOPHEN 325 MG/1
650 TABLET ORAL
Status: DISCONTINUED | OUTPATIENT
Start: 2018-09-20 | End: 2018-09-21 | Stop reason: HOSPADM

## 2018-09-20 RX ORDER — NEOSTIGMINE METHYLSULFATE 1 MG/ML
INJECTION INTRAVENOUS AS NEEDED
Status: DISCONTINUED | OUTPATIENT
Start: 2018-09-20 | End: 2018-09-20 | Stop reason: HOSPADM

## 2018-09-20 RX ORDER — OXYCODONE AND ACETAMINOPHEN 5; 325 MG/1; MG/1
1-2 TABLET ORAL
Qty: 30 TAB | Refills: 0 | Status: SHIPPED | OUTPATIENT
Start: 2018-09-20 | End: 2019-02-11 | Stop reason: ALTCHOICE

## 2018-09-20 RX ORDER — LISINOPRIL 20 MG/1
20 TABLET ORAL DAILY
Status: DISCONTINUED | OUTPATIENT
Start: 2018-09-21 | End: 2018-09-21 | Stop reason: HOSPADM

## 2018-09-20 RX ORDER — OXYCODONE AND ACETAMINOPHEN 7.5; 325 MG/1; MG/1
1 TABLET ORAL
Status: DISCONTINUED | OUTPATIENT
Start: 2018-09-20 | End: 2018-09-21 | Stop reason: HOSPADM

## 2018-09-20 RX ORDER — DOCUSATE SODIUM 100 MG/1
100 CAPSULE, LIQUID FILLED ORAL 2 TIMES DAILY
Status: DISCONTINUED | OUTPATIENT
Start: 2018-09-20 | End: 2018-09-21 | Stop reason: HOSPADM

## 2018-09-20 RX ORDER — CEFAZOLIN SODIUM/WATER 2 G/20 ML
2 SYRINGE (ML) INTRAVENOUS EVERY 8 HOURS
Status: COMPLETED | OUTPATIENT
Start: 2018-09-20 | End: 2018-09-21

## 2018-09-20 RX ORDER — HYDROMORPHONE HYDROCHLORIDE 2 MG/ML
.5-1 INJECTION, SOLUTION INTRAMUSCULAR; INTRAVENOUS; SUBCUTANEOUS
Status: DISCONTINUED | OUTPATIENT
Start: 2018-09-20 | End: 2018-09-20 | Stop reason: HOSPADM

## 2018-09-20 RX ORDER — SODIUM CHLORIDE 0.9 % (FLUSH) 0.9 %
5-10 SYRINGE (ML) INJECTION AS NEEDED
Status: DISCONTINUED | OUTPATIENT
Start: 2018-09-20 | End: 2018-09-21 | Stop reason: HOSPADM

## 2018-09-20 RX ORDER — NALOXONE HYDROCHLORIDE 0.4 MG/ML
0.4 INJECTION, SOLUTION INTRAMUSCULAR; INTRAVENOUS; SUBCUTANEOUS AS NEEDED
Status: DISCONTINUED | OUTPATIENT
Start: 2018-09-20 | End: 2018-09-21 | Stop reason: HOSPADM

## 2018-09-20 RX ORDER — PROPOFOL 10 MG/ML
INJECTION, EMULSION INTRAVENOUS AS NEEDED
Status: DISCONTINUED | OUTPATIENT
Start: 2018-09-20 | End: 2018-09-20 | Stop reason: HOSPADM

## 2018-09-20 RX ADMIN — SODIUM CHLORIDE, SODIUM LACTATE, POTASSIUM CHLORIDE, AND CALCIUM CHLORIDE: 600; 310; 30; 20 INJECTION, SOLUTION INTRAVENOUS at 08:48

## 2018-09-20 RX ADMIN — ROCURONIUM BROMIDE 20 MG: 10 INJECTION, SOLUTION INTRAVENOUS at 09:03

## 2018-09-20 RX ADMIN — FENTANYL CITRATE 50 MCG: 50 INJECTION, SOLUTION INTRAMUSCULAR; INTRAVENOUS at 08:28

## 2018-09-20 RX ADMIN — DOCUSATE SODIUM 100 MG: 100 CAPSULE, LIQUID FILLED ORAL at 12:36

## 2018-09-20 RX ADMIN — SODIUM CHLORIDE, SODIUM LACTATE, POTASSIUM CHLORIDE, AND CALCIUM CHLORIDE 125 ML/HR: 600; 310; 30; 20 INJECTION, SOLUTION INTRAVENOUS at 06:49

## 2018-09-20 RX ADMIN — ROCURONIUM BROMIDE 5 MG: 10 INJECTION, SOLUTION INTRAVENOUS at 08:38

## 2018-09-20 RX ADMIN — ROCURONIUM BROMIDE 25 MG: 10 INJECTION, SOLUTION INTRAVENOUS at 08:46

## 2018-09-20 RX ADMIN — FENTANYL CITRATE 50 MCG: 50 INJECTION, SOLUTION INTRAMUSCULAR; INTRAVENOUS at 09:05

## 2018-09-20 RX ADMIN — Medication 2 G: at 08:49

## 2018-09-20 RX ADMIN — Medication 10 ML: at 14:00

## 2018-09-20 RX ADMIN — ONDANSETRON 4 MG: 2 INJECTION INTRAMUSCULAR; INTRAVENOUS at 10:17

## 2018-09-20 RX ADMIN — PROPOFOL 150 MG: 10 INJECTION, EMULSION INTRAVENOUS at 08:39

## 2018-09-20 RX ADMIN — HYDROMORPHONE HYDROCHLORIDE 0.5 MG: 2 INJECTION, SOLUTION INTRAMUSCULAR; INTRAVENOUS; SUBCUTANEOUS at 10:10

## 2018-09-20 RX ADMIN — HYDROMORPHONE HYDROCHLORIDE 0.5 MG: 2 INJECTION, SOLUTION INTRAMUSCULAR; INTRAVENOUS; SUBCUTANEOUS at 10:00

## 2018-09-20 RX ADMIN — DEXAMETHASONE SODIUM PHOSPHATE 8 MG: 4 INJECTION, SOLUTION INTRA-ARTICULAR; INTRALESIONAL; INTRAMUSCULAR; INTRAVENOUS; SOFT TISSUE at 09:19

## 2018-09-20 RX ADMIN — PROPOFOL 20 MG: 10 INJECTION, EMULSION INTRAVENOUS at 10:10

## 2018-09-20 RX ADMIN — DOCUSATE SODIUM 100 MG: 100 CAPSULE, LIQUID FILLED ORAL at 18:40

## 2018-09-20 RX ADMIN — ROCURONIUM BROMIDE 10 MG: 10 INJECTION, SOLUTION INTRAVENOUS at 09:39

## 2018-09-20 RX ADMIN — SUCCINYLCHOLINE CHLORIDE 160 MG: 20 INJECTION INTRAMUSCULAR; INTRAVENOUS at 08:39

## 2018-09-20 RX ADMIN — SODIUM CHLORIDE, SODIUM LACTATE, POTASSIUM CHLORIDE, CALCIUM CHLORIDE, AND DEXTROSE MONOHYDRATE 150 ML/HR: 600; 310; 30; 20; 5 INJECTION, SOLUTION INTRAVENOUS at 12:36

## 2018-09-20 RX ADMIN — FENTANYL CITRATE 50 MCG: 50 INJECTION, SOLUTION INTRAMUSCULAR; INTRAVENOUS at 08:37

## 2018-09-20 RX ADMIN — LIDOCAINE HYDROCHLORIDE 100 MG: 20 INJECTION, SOLUTION EPIDURAL; INFILTRATION; INTRACAUDAL; PERINEURAL at 08:38

## 2018-09-20 RX ADMIN — FENTANYL CITRATE 50 MCG: 50 INJECTION, SOLUTION INTRAMUSCULAR; INTRAVENOUS at 09:16

## 2018-09-20 RX ADMIN — FENTANYL CITRATE 50 MCG: 50 INJECTION, SOLUTION INTRAMUSCULAR; INTRAVENOUS at 09:39

## 2018-09-20 RX ADMIN — Medication 2 G: at 15:50

## 2018-09-20 RX ADMIN — SODIUM CHLORIDE, SODIUM LACTATE, POTASSIUM CHLORIDE, CALCIUM CHLORIDE, AND DEXTROSE MONOHYDRATE 150 ML/HR: 600; 310; 30; 20; 5 INJECTION, SOLUTION INTRAVENOUS at 19:26

## 2018-09-20 RX ADMIN — SODIUM CHLORIDE, SODIUM LACTATE, POTASSIUM CHLORIDE, AND CALCIUM CHLORIDE: 600; 310; 30; 20 INJECTION, SOLUTION INTRAVENOUS at 10:32

## 2018-09-20 RX ADMIN — GLYCOPYRROLATE 0.4 MG: 0.2 INJECTION INTRAMUSCULAR; INTRAVENOUS at 10:17

## 2018-09-20 RX ADMIN — NEOSTIGMINE METHYLSULFATE 2 MG: 1 INJECTION INTRAVENOUS at 10:17

## 2018-09-20 NOTE — H&P (VIEW-ONLY)
[facility] Pre-Op History & Physical 
 
Patient: Nohemi Gomez                  MRN: [id number]          SSN: [id number] YOB: 1935          Age: 82 y.o. Sex: male Subjective:  
Patient is a 82 y.o.  male who presents with history of left kidney mass. Patient has complained of left inguinal pain and was referred by his PCP's office to general surgeon ( Dr Delicia Borja) for evaluation. CT showed left renal mass. Patient states he still has rare discomfort in his left groin- rates pain 1/10 and describes it as a dull ache. States that he notices the pain most when he picks up a heavy object. The patient was evaluated in the surgeon's office and it was determined that the most appropriate plan of care is to proceed with surgical intervention. Patient's PCP Penny Kendrick MD 
 
Patient has severe white coat HTN- BP elevated in PAT but was 122/68 at home this morning per patient report. Past Medical History:  
Diagnosis Date  Arthritis  Benign essential HTN   
 took medication  GERD (gastroesophageal reflux disease)   
 occasional r/t food intake  Hernia  Iron deficiency anemia Dr. Vargas Patino. 2014. on Xarelto. did iron infusions.  Left renal mass 07/2018  Pulmonary emboli ([address] 2014 Dr. Trevin Snatos. Xarelto caused anemia  White coat syndrome with hypertension 2/15/2018 Past Surgical History:  
Procedure Laterality Date  HX COLONOSCOPY  2014  
 no source of bleeding  HX ENDOSCOPY  2014  
 no sournce of bleeding  HX ORTHOPAEDIC    
 trigger fingers both pinky Prior to Admission medications Medication Sig Start Date End Date Taking? Authorizing Provider  
lisinopril (PRINIVIL, ZESTRIL) 20 mg tablet TAKE 1 TABLET BY MOUTH DAILY. 8/7/18  Yes Penny Kendrick MD  
omeprazole (PRILOSEC) 10 mg capsule Take 10 mg by mouth daily as needed. Yes Historical Provider ASPIRIN (ASPIR-81 PO) Take  by mouth daily. Yes Historical Provider Current Outpatient Prescriptions Medication Sig  
 lisinopril (PRINIVIL, ZESTRIL) 20 mg tablet TAKE 1 TABLET BY MOUTH DAILY.  omeprazole (PRILOSEC) 10 mg capsule Take 10 mg by mouth daily as needed.  ASPIRIN (ASPIR-81 PO) Take  by mouth daily. No current facility-administered medications for this encounter. Allergies Allergen Reactions  Influenza Virus Vaccine, Specific Other (comments) \"made him sick\" Social History Substance Use Topics  Smoking status: Former Smoker Packs/day: 1.00 Years: 5.00 Quit date: 80  Smokeless tobacco: Never Used  Alcohol use 1.8 oz/week 3 Cans of beer per week History Drug Use No  
 
Family History Problem Relation Age of Onset  Stroke Sister  Cancer Brother   
  growth on kidney  No Known Problems Mother  No Known Problems Father  No Known Problems Sister Review of Systems Patient denies difficulty swallowing, mouth sores, or loose teeth. Patient denies any recent dental procedures in last christopher or any planned prior to surgery. Patient denies chest pain, tightness, pain radiating down left arm, palpitations. Denies dizziness, visual disturbances, or lightheadedness. Patient denies shortness of breath, wheezing, cough, fever, or chills. Patient denies diarrhea, constipation, or abdominal pain. Patient denies urinary problems including dysuria, hesitancy, urgency, or incontinence. Denies skin breakdown, rashes, insect bites or open area. Objective:  
Patient Vitals for the past 24 hrs: 
 Temp Pulse Resp BP SpO2  
18 1059 98.6 °F (37 °C) 87 20 (!) 186/106 97 % Temp (24hrs), Av.6 °F (37 °C), Min:98.6 °F (37 °C), Max:98.6 °F (37 °C) Body mass index is 31.12 kg/(m^2). Wt Readings from Last 1 Encounters:  
18 117.5 kg (259 lb) Physical Exam: General: Pleasant,  cooperative, no apparent distress, appears younger than stated age. Eyes: Conjunctivae/corneas clear. EOMs intact. Nose: Nares normal. 
 Mouth/Throat: Lips, mucosa, and tongue normal. Teeth and gums normal. 
 Neck: Supple, symmetrical, trachea midline. Back: Symmetric Lungs: Clear to auscultation bilaterally. Heart: Slightly iregular rate and rhythm. S1, S2 normal. No murmur, click, rub or gallop. Abdomen: Soft, non-tender. Bowel sounds normal. No distention. Musculoskeletal:  Unremarkable. Extremities:  Extremities normal, atraumatic, no cyanosis or edema. Calves 
                               supple, non tender to palpation. Pulses: 2+ and symmetric bilateral upper extremities. Cap. refill <2 seconds Skin: Skin color, texture, turgor normal.  No visible rashes or lesions. Neurologic: CN II-XII grossly intact. Alert and oriented x3. Labs:  
Recent Results (from the past 72 hour(s)) CBC WITH AUTOMATED DIFF Collection Time: 09/13/18 10:14 AM  
Result Value Ref Range WBC 5.9 4.1 - 11.1 K/uL  
 RBC 4.58 4.10 - 5.70 M/uL  
 HGB 14.0 12.1 - 17.0 g/dL HCT 44.4 36.6 - 50.3 % MCV 96.9 80.0 - 99.0 FL  
 MCH 30.6 26.0 - 34.0 PG  
 MCHC 31.5 30.0 - 36.5 g/dL  
 RDW 12.9 11.5 - 14.5 % PLATELET 418 498 - 131 K/uL MPV 10.6 8.9 - 12.9 FL  
 NRBC 0.0 0  WBC ABSOLUTE NRBC 0.00 0.00 - 0.01 K/uL NEUTROPHILS 68 32 - 75 % LYMPHOCYTES 21 12 - 49 % MONOCYTES 9 5 - 13 % EOSINOPHILS 2 0 - 7 % BASOPHILS 1 0 - 1 % IMMATURE GRANULOCYTES 0 0.0 - 0.5 % ABS. NEUTROPHILS 4.0 1.8 - 8.0 K/UL  
 ABS. LYMPHOCYTES 1.2 0.8 - 3.5 K/UL  
 ABS. MONOCYTES 0.5 0.0 - 1.0 K/UL  
 ABS. EOSINOPHILS 0.1 0.0 - 0.4 K/UL  
 ABS. BASOPHILS 0.1 0.0 - 0.1 K/UL  
 ABS. IMM. GRANS. 0.0 0.00 - 0.04 K/UL  
 DF AUTOMATED METABOLIC PANEL, COMPREHENSIVE Collection Time: 09/13/18 10:14 AM  
Result Value Ref Range  Sodium 141 136 - 145 mmol/L  
 Potassium 5.2 (H) 3.5 - 5.1 mmol/L Chloride 106 97 - 108 mmol/L  
 CO2 29 21 - 32 mmol/L Anion gap 6 5 - 15 mmol/L Glucose 88 65 - 100 mg/dL BUN 17 6 - 20 MG/DL Creatinine 1.06 0.70 - 1.30 MG/DL  
 BUN/Creatinine ratio 16 12 - 20 GFR est AA >60 >60 ml/min/1.73m2 GFR est non-AA >60 >60 ml/min/1.73m2 Calcium 9.4 8.5 - 10.1 MG/DL Bilirubin, total 0.4 0.2 - 1.0 MG/DL  
 ALT (SGPT) 26 12 - 78 U/L  
 AST (SGOT) 18 15 - 37 U/L Alk. phosphatase 105 45 - 117 U/L Protein, total 7.2 6.4 - 8.2 g/dL Albumin 3.6 3.5 - 5.0 g/dL Globulin 3.6 2.0 - 4.0 g/dL A-G Ratio 1.0 (L) 1.1 - 2.2 URINALYSIS W/ REFLEX CULTURE Collection Time: 09/13/18 10:14 AM  
Result Value Ref Range Color YELLOW/STRAW Appearance CLEAR CLEAR Specific gravity 1.014 1.003 - 1.030    
 pH (UA) 5.5 5.0 - 8.0 Protein NEGATIVE  NEG mg/dL Glucose NEGATIVE  NEG mg/dL Ketone NEGATIVE  NEG mg/dL Bilirubin NEGATIVE  NEG Blood NEGATIVE  NEG Urobilinogen 0.2 0.2 - 1.0 EU/dL Nitrites NEGATIVE  NEG Leukocyte Esterase NEGATIVE  NEG    
 WBC 0-4 0 - 4 /hpf  
 RBC 0-5 0 - 5 /hpf Epithelial cells FEW FEW /lpf Bacteria NEGATIVE  NEG /hpf  
 UA:UC IF INDICATED CULTURE NOT INDICATED BY UA RESULT CNI Hyaline cast 0-2 0 - 5 /lpf EKG, 12 LEAD, INITIAL Collection Time: 09/13/18 10:28 AM  
Result Value Ref Range Ventricular Rate 79 BPM  
 Atrial Rate 91 BPM  
 P-R Interval 202 ms QRS Duration 114 ms Q-T Interval 370 ms QTC Calculation (Bezet) 424 ms Calculated P Axis 70 degrees Calculated R Axis -44 degrees Calculated T Axis 46 degrees Diagnosis Sinus rhythm with marked sinus arrhythmia with occasional premature  
ventricular complexes Left axis deviation Moderate voltage criteria for LVH, may be normal variant Abnormal ECG No previous ECGs available Radiology: 
 
Final result (Exam End: 9/13/2018 10:38 AM) Open Study Result CLINICAL HISTORY: Preop INDICATION: Preop, anemia, GERD, hernia 
  
COMPARISON: None 
  
FINDINGS:  
PA and lateral views of the chest are obtained. The cardiopericardial silhouette is within normal limits. There is no pleural 
effusion, pneumothorax or focal consolidation present. 
  
IMPRESSION IMPRESSION: No acute intrathoracic disease. Assessment:  
 
Left renal mass/cyst. 
 
Plan:  
 
Scheduled for laparoscopic left partial nephrectomy. Labs, CXR,  and EKG done per surgeon's orders. Lab results and CXR reviewed- unremarkable. EKG interpretation pending.  
 
 
Janeen Kang NP

## 2018-09-20 NOTE — INTERVAL H&P NOTE
H&P Update:  Gayla Guan was seen and examined. History and physical has been reviewed. Significant clinical changes have occurred as noted:  Films reviewed and I have discussed options with the patient. He wishes to have a total L nephrectomy. This was planned but scheduled incorrectly. He understands it could be benign.     Signed By: Shadia Rm MD     September 20, 2018 8:00 AM

## 2018-09-20 NOTE — PERIOP NOTES
TRANSFER - OUT REPORT:    Verbal report given to [address] [place] [address] [address] on [employer]  being transferred to [address] [place] [address] 821) for routine post - op       Report consisted of patients Situation, Background, Assessment and   Recommendations(SBAR). Information from the following report(s) SBAR was reviewed with the receiving nurse. Lines:   Peripheral IV 09/20/18 Right Hand (Active)   Site Assessment Clean, dry, & intact 9/20/2018 11:15 AM   Phlebitis Assessment 0 9/20/2018 11:15 AM   Infiltration Assessment 0 9/20/2018 11:15 AM   Dressing Status Clean, dry, & intact 9/20/2018 11:15 AM   Dressing Type Transparent 9/20/2018 11:15 AM   Hub Color/Line Status Pink 9/20/2018 11:15 AM       Peripheral IV 09/20/18 Left Hand (Active)   Site Assessment Clean, dry, & intact 9/20/2018 11:15 AM   Phlebitis Assessment 0 9/20/2018 11:15 AM   Infiltration Assessment 0 9/20/2018 11:15 AM   Dressing Status Clean, dry, & intact 9/20/2018 11:15 AM   Dressing Type Transparent 9/20/2018 11:15 AM   Hub Color/Line Status Green 9/20/2018 11:15 AM        Opportunity for questions and clarification was provided.       Patient transported with:   Registered Nurse

## 2018-09-20 NOTE — BRIEF OP NOTE
BRIEF OPERATIVE NOTE    Date of Procedure: 9/20/2018   Preoperative Diagnosis: LEFT RENAL CYST, LEFT RENAL MASS  Postoperative Diagnosis: LEFT RENAL CYST, LEFT RENAL MASS    Procedure(s):  LAPAROSCOPIC LEFT TOTAL NEPHRECTOMY   Surgeon(s) and Role:     * Shadia Rm MD - Primary         Surgical Assistant: 0    Surgical Staff:  Circ-1: Sierra Robins RN  Circ-Relief: Fernanda Puff  Scrub Tech-1: Mark Coronel  Surg Asst-1: Nikita Pereira  Surg Asst-Relief: Jessi Wilson  Float Staff: Ciro Reilly RN;  Bhavna Hernandez  Event Time In   Incision Start 3309   Incision Close      Anesthesia: General   Estimated Blood Loss: 20  Specimens:   ID Type Source Tests Collected by Time Destination   1 : Left kidney Preservative Kidney, Left  Shadia Rm MD 9/20/2018 7306 Pathology      Findings: large kidney specimen   Complications: 0  Implants: * No implants in log *

## 2018-09-20 NOTE — IP AVS SNAPSHOT
[address] [id number] Patient: Briscoe Boast MRN: [id number] KTW:9/9/0800 About your hospitalization You were admitted on:  September 20, 2018 You last received care in the:  [facility] [place] 5M1 MED SURG 1 You were discharged on:  September 21, 2018 Why you were hospitalized Your primary diagnosis was:  Not on File Your diagnoses also included:  Renal Mass, Left Follow-up Information None Discharge Orders None A check balwinder indicates which time of day the medication should be taken. My Medications START taking these medications Instructions Each Dose to Equal  
 Morning Noon Evening Bedtime  
 oxyCODONE-acetaminophen 5-325 mg per tablet Commonly known as:  PERCOCET Your last dose was: Your next dose is: Take 1-2 Tabs by mouth every four (4) hours as needed for Pain. Max Daily Amount: 12 Tabs. 1-2 Tab CONTINUE taking these medications Instructions Each Dose to Equal  
 Morning Noon Evening Bedtime ASPIR-81 PO Your last dose was: Your next dose is: Take  by mouth daily. lisinopril 20 mg tablet Commonly known as:  Bolivar Economy Your last dose was: Your next dose is: TAKE 1 TABLET BY MOUTH DAILY. Where to Get Your Medications Information on where to get these meds will be given to you by the nurse or doctor. ! Ask your nurse or doctor about these medications  
  oxyCODONE-acetaminophen 5-325 mg per tablet Opioid Education Prescription Opioids: What You Need to Know: 
 
Prescription opioids can be used to help relieve moderate-to-severe pain and are often prescribed following a surgery or injury, or for certain health conditions.   These medications can be an important part of treatment but also come with serious risks. Opioids are strong pain medicines. Examples include hydrocodone, oxycodone, fentanyl, and morphine. Heroin is an example of an illegal opioid. It is important to work with your health care provider to make sure you are getting the safest, most effective care. WHAT ARE THE RISKS AND SIDE EFFECTS OF OPIOID USE? Prescription opioids carry serious risks of addiction and overdose, especially with prolonged use. An opioid overdose, often marked by slow breathing, can cause sudden death. The use of prescription opioids can have a number of side effects as well, even when taken as directed. · Tolerance-meaning you might need to take more of a medication for the same pain relief · Physical dependence-meaning you have symptoms of withdrawal when the medication is stopped. Withdrawal symptoms can include nausea, sweating, chills, diarrhea, stomach cramps, and muscle aches. Withdrawal can last up to several weeks, depending on which drug you took and how long you took it. · Increased sensitivity to pain · Constipation · Nausea, vomiting, and dry mouth · Sleepiness and dizziness · Confusion · Depression · Low levels of testosterone that can result in lower sex drive, energy, and strength · Itching and sweating RISKS ARE GREATER WITH:      
· History of drug misuse, substance use disorder, or overdose · Mental health conditions (such as depression or anxiety) · Sleep apnea · Older age (72 years or older) · Pregnancy Avoid alcohol while taking prescription opioids. Also, unless specifically advised by your health care provider, medications to avoid include: · Benzodiazepines (such as Xanax or Valium) · Muscle relaxants (such as Soma or Flexeril) · Hypnotics (such as Ambien or Lunesta) · Other prescription opioids KNOW YOUR OPTIONS Talk to your health care provider about ways to manage your pain that don't involve prescription opioids. Some of these options may actually work better and have fewer risks and side effects. Consult your physician before adding or stopping any medications, treatments, or physical activity. Options may include: 
· Pain relievers such as acetaminophen, ibuprofen, and naproxen · Some medications that are also used for depression or seizures · Physical therapy and exercise · Counseling to help patients learn how to cope better with triggers of pain and stress. · Application of heat or cold compress · Massage therapy · Relaxation techniques Be Informed Make sure you know the name of your medication, how much and how often to take it, and its potential risks & side effects. IF YOU ARE PRESCRIBED OPIOIDS FOR PAIN: 
· Never take opioids in greater amounts or more often than prescribed. Remember the goal is not to be pain-free but to manage your pain at a tolerable level. · Follow up with your primary care provider to: · Work together to create a plan on how to manage your pain. · Talk about ways to help manage your pain that don't involve prescription opioids. · Talk about any and all concerns and side effects. · Help prevent misuse and abuse. · Never sell or share prescription opioids · Help prevent misuse and abuse. · Store prescription opioids in a secure place and out of reach of others (this may include visitors, children, friends, and family). · Safely dispose of unused/unwanted prescription opioids: Find your community drug take-back program or your pharmacy mail-back program, or flush them down the toilet, following guidance from the Food and Drug Administration (www.fda.gov/Drugs/ResourcesForYou). · Visit www.cdc.gov/drugoverdose to learn about the risks of opioid abuse and overdose. · If you believe you may be struggling with addiction, tell your health care provider and ask for guidance or call [employer] at 1-948-618-ZBGC. Discharge Instructions None Introducing [facility]! Mikey Zuniga introduces Kensho patient portal. Now you can access parts of your medical record, email your doctor's office, and request medication refills online. 1. In your internet browser, go to [email or web address] 2. Click on the First Time User? Click Here link in the Sign In box. You will see the New Member Sign Up page. 3. Enter your Kensho Access Code exactly as it appears below. You will not need to use this code after youve completed the sign-up process. If you do not sign up before the expiration date, you must request a new code. · Kensho Access Code: [email or web address] Expires: 9/25/2018 11:35 AM 
 
4. Enter the last four digits of your Social Security Number (xxxx) and Date of Birth (mm/dd/yyyy) as indicated and click Submit. You will be taken to the next sign-up page. 5. Create a Kensho ID. This will be your Kensho login ID and cannot be changed, so think of one that is secure and easy to remember. 6. Create a Kensho password. You can change your password at any time. 7. Enter your Password Reset Question and Answer. This can be used at a later time if you forget your password. 8. Enter your e-mail address. You will receive e-mail notification when new information is available in [address] 9. Click Sign Up. You can now view and download portions of your medical record. 10. Click the Download Summary menu link to download a portable copy of your medical information. If you have questions, please visit the Frequently Asked Questions section of the Kensho website. Remember, Kensho is NOT to be used for urgent needs. For medical emergencies, dial 911. Now available from your iPhone and Android! Introducing Tacos Wan As a Mikey Zuniga patient, I wanted to make you aware of our electronic visit tool called Tacos Wan. [employer] 24/7 allows you to connect within minutes with a medical provider 24 hours a day, seven days a week via a mobile device or tablet or logging into a secure website from your computer. You can access Tangled from anywhere in the [place]. A virtual visit might be right for you when you have a simple condition and feel like you just dont want to get out of bed, or cant get away from work for an appointment, when your regular [employer] provider is not available (evenings, weekends or holidays), or when youre out of town and need minor care. Electronic visits cost only $49 and if the [employer] 24/7 provider determines a prescription is needed to treat your condition, one can be electronically transmitted to a nearby pharmacy*. Please take a moment to enroll today if you have not already done so. The enrollment process is free and takes just a few minutes. To enroll, please download the [employer] 24/7 janae to your tablet or phone, or visit [email or web address] to enroll on your computer. And, as an [address] [place] [address] patient with a [employer] account, the results of your visits will be scanned into your electronic medical record and your primary care provider will be able to view the scanned results. We urge you to continue to see your regular [employer] provider for your ongoing medical care. And while your primary care provider may not be the one available when you seek a [employer] virtual visit, the peace of mind you get from getting a real diagnosis real time can be priceless. For more information on [employer], view our Frequently Asked Questions (FAQs) at [email or web address]. Sincerely, 
 
Peg Delgado MD 
Chief Medical Officer [facility] Hemphill *:  certain medications cannot be prescribed via [employer] Providers Seen During Your Hospitalization Provider Specialty Primary office phone Josué Madsen MD Urology [phone] Your Primary Care Physician (PCP) Primary Care Physician Office Phone Office Fax Toni julio césar [phone] You are allergic to the following Allergen Reactions Influenza Virus Vaccine, Specific Other (comments) \"made him sick\" Recent Documentation Height Weight BMI Smoking Status 1.943 m 117.5 kg 31.12 kg/m2 Former Smoker Emergency Contacts Name Discharge Info Relation Home Work Mobile Elaina Gibbs DISCHARGE CAREGIVER [3] Spouse [3] [phone] Patient Belongings The following personal items are in your possession at time of discharge: 
  Dental Appliances: None  Visual Aid: Glasses, With patient      Home Medications: None   Jewelry: None  Clothing: Other (comment) (clothing sent to PACU)    Other Valuables: Other (comment) (upper teeth and glassess given to wife) Please provide this summary of care documentation to your next provider. Signatures-by signing, you are acknowledging that this After Visit Summary has been reviewed with you and you have received a copy. Patient Signature:  ____________________________________________________________ Date:  ____________________________________________________________  
  
Poonam Oscar Provider Signature:  ____________________________________________________________ Date:  ____________________________________________________________

## 2018-09-20 NOTE — ANESTHESIA PREPROCEDURE EVALUATION
Anesthetic History No history of anesthetic complications Review of Systems / Medical History Patient summary reviewed and pertinent labs reviewed Pulmonary Comments: Hx of PE Neuro/Psych Within defined limits Cardiovascular Hypertension Exercise tolerance: >4 METS 
  
GI/Hepatic/Renal 
  
GERD: well controlled Endo/Other Within defined limits Other Findings Physical Exam 
 
Airway Mallampati: II 
TM Distance: > 6 cm Neck ROM: normal range of motion Mouth opening: Normal 
 
 Cardiovascular Rhythm: regular Rate: normal 
 
 
 
 Dental 
 
Dentition: Full upper dentures and Ancel Higashi Pulmonary Breath sounds clear to auscultation Abdominal 
 
 
 
 Other Findings Anesthetic Plan ASA: 2 Anesthesia type: general 
 
 
 
 
 
Anesthetic plan and risks discussed with: Patient and Spouse

## 2018-09-20 NOTE — ANESTHESIA POSTPROCEDURE EVALUATION
Post-Anesthesia Evaluation and Assessment Patient: Lara Smith MRN: [id number]  SSN: [id number] YOB: 1935  Age: 82 y.o. Sex: male Cardiovascular Function/Vital Signs Visit Vitals  /68  Pulse (!) 58  Temp 36.5 °C (97.7 °F)  Resp 14  
 Ht 6' 4.5\" (1.943 m)  Wt 117.5 kg (259 lb)  SpO2 96%  BMI 31.12 kg/m2 Patient is status post general anesthesia for Procedure(s): LAPAROSCOPIC LEFT TOTAL NEPHRECTOMY . Nausea/Vomiting: None Postoperative hydration reviewed and adequate. Pain: 
Pain Scale 1: Numeric (0 - 10) (09/20/18 1045) Pain Intensity 1: 0 (09/20/18 1045) Managed Neurological Status:  
Neuro (WDL): Within Defined Limits (09/20/18 1045) At baseline Mental Status and Level of Consciousness: Arousable Pulmonary Status:  
O2 Device: Nasal cannula (09/20/18 1045) Adequate oxygenation and airway patent Complications related to anesthesia: None Post-anesthesia assessment completed. No concerns Signed By: Valente Natarajan MD   
 September 20, 2018

## 2018-09-20 NOTE — OP NOTES
[facility]  OPERATIVE REPORT    Name:Shira MULLINS  MR#: [id number]  : 1935  ACCOUNT #: [de-identified]   DATE OF SERVICE: 2018    PREOPERATIVE DIAGNOSIS:  Left renal mass worrisome for renal cell carcinoma. POSTOPERATIVE DIAGNOSIS:  Left renal mass worrisome for renal cell carcinoma. PROCEDURE PERFORMED:  Laparoscopic hand-assisted left radical nephrectomy. SURGEON:  Wil Rosas MD     ANESTHESIA:  General.    INDICATIONS:  A gentleman who had a CT scan done for some left-sided abdominal pain. He was found to have a 5.5 cm mass in the left kidney. It is centrally located. Options for dealing with it were discussed. He wished to undergo laparoscopic total left nephrectomy. Contralateral kidney just had some cyst.  Creatinine is normal.    FINDINGS AT TIME OF THE PROCEDURE:  A large renal specimen with a copious amount of perirenal fat removed. ESTIMATED BLOOD LOSS:  Less than 20 mL. ASSISTANT:  None. SPECIMENS REMOVED:  Left kidney. IMPLANTS:  None. COMPLICATIONS:  None. DESCRIPTION OF PROCEDURE:  After consent was obtained, the patient was taken to the operating room. After adequate anesthesia was obtained, Ventura catheter was inserted. He was positioned in lateral decubitus left side up. His head, neck, axilla, arms and legs were all padded appropriately, secured with a beanbag and seat belts and then prepped and draped. Incision was then made lateral and superior to the umbilicus. Soft tissue was dissected with Bovie. Then, using the visual obturator, placed a 12 mm port. No injury to underlying structures was noted. Another port was placed in the left lower quadrant. I then made a midline incision between the umbilicus and the xiphoid. That was extended for 7.5 cm. Lap pad was placed in the abdomen after I entered the abdomen under direct vision while the abdomen was insufflated. The Gelport was placed here.   Then with my left hand in the abdomen, I used the LigaSure device to reflect the colon along the line of Toldt from the splenic flexure all the way down to the pelvis. I dissected that medially until I got to the level of the gonadal vein. I then opened up the rectus fascia at the lower pole of the kidney and identified the gonadal vein and ureter. I then took down the lower pole attachments using the LigaSure device. I then dissected up superiorly following the gonadal vessel until I found the renal vein. I then circumferentially dilated the renal vein and artery on the renal side of the gonadal and adrenal vessels. I then ligated and divided the artery and vein complex en bloc with the Ethicon 45 stapler. No significant bleeding was noted. Adequate staple line was seen. I then took down all the lateral and superior attachments utilizing the LigaSure device. I then developed a plane between the upper pole of the kidney and the adrenal gland, took down all those attachments, again using the LigaSure device. Once the kidney was completely freed up, it was placed in the lower abdomen. The operative site was reinspected and watched under low pressure. No bleeding was noted. Port sites were checked. No bleeding was noted. The specimen was then removed through the hand port site, which I had to extend for about 4 cm in order to allow passage of a very large specimen. We rechecked for bleeding; none was noted. I then closed the midline incision using running #1 PDS suture, started at the apices and tied in the middle. A 0 Vicryl suture was used to close the camera port site. The accessory port site did not need any additional closure. Subcuticular stitches and Dermabond used to close the skin. Overall, he tolerated the procedure well, was awakened from anesthesia and taken to recovery room in stable condition. He will be admitted for postoperative care.       MD VARSHA Hill / ELIAS  D: 09/20/2018 10:27 T: 09/20/2018 12:09  JOB #: [id number]

## 2018-09-21 VITALS
DIASTOLIC BLOOD PRESSURE: 99 MMHG | OXYGEN SATURATION: 92 % | BODY MASS INDEX: 30.58 KG/M2 | HEART RATE: 75 BPM | WEIGHT: 259 LBS | SYSTOLIC BLOOD PRESSURE: 166 MMHG | HEIGHT: 77 IN | RESPIRATION RATE: 15 BRPM | TEMPERATURE: 97.7 F

## 2018-09-21 LAB
ANION GAP SERPL CALC-SCNC: 6 MMOL/L (ref 5–15)
BUN SERPL-MCNC: 18 MG/DL (ref 6–20)
BUN/CREAT SERPL: 12 (ref 12–20)
CALCIUM SERPL-MCNC: 9.5 MG/DL (ref 8.5–10.1)
CHLORIDE SERPL-SCNC: 106 MMOL/L (ref 97–108)
CO2 SERPL-SCNC: 28 MMOL/L (ref 21–32)
CREAT SERPL-MCNC: 1.56 MG/DL (ref 0.7–1.3)
ERYTHROCYTE [DISTWIDTH] IN BLOOD BY AUTOMATED COUNT: 12.5 % (ref 11.5–14.5)
GLUCOSE SERPL-MCNC: 105 MG/DL (ref 65–100)
HCT VFR BLD AUTO: 42.5 % (ref 36.6–50.3)
HGB BLD-MCNC: 13.3 G/DL (ref 12.1–17)
MCH RBC QN AUTO: 30.3 PG (ref 26–34)
MCHC RBC AUTO-ENTMCNC: 31.3 G/DL (ref 30–36.5)
MCV RBC AUTO: 96.8 FL (ref 80–99)
NRBC # BLD: 0 K/UL (ref 0–0.01)
NRBC BLD-RTO: 0 PER 100 WBC
PLATELET # BLD AUTO: 233 K/UL (ref 150–400)
PMV BLD AUTO: 10.6 FL (ref 8.9–12.9)
POTASSIUM SERPL-SCNC: 4.3 MMOL/L (ref 3.5–5.1)
RBC # BLD AUTO: 4.39 M/UL (ref 4.1–5.7)
SODIUM SERPL-SCNC: 140 MMOL/L (ref 136–145)
WBC # BLD AUTO: 9.3 K/UL (ref 4.1–11.1)

## 2018-09-21 PROCEDURE — 80048 BASIC METABOLIC PNL TOTAL CA: CPT | Performed by: UROLOGY

## 2018-09-21 PROCEDURE — 85027 COMPLETE CBC AUTOMATED: CPT | Performed by: UROLOGY

## 2018-09-21 PROCEDURE — 74011258636 HC RX REV CODE- 258/636: Performed by: UROLOGY

## 2018-09-21 PROCEDURE — 74011250636 HC RX REV CODE- 250/636: Performed by: UROLOGY

## 2018-09-21 PROCEDURE — 36415 COLL VENOUS BLD VENIPUNCTURE: CPT | Performed by: UROLOGY

## 2018-09-21 PROCEDURE — 74011250637 HC RX REV CODE- 250/637: Performed by: UROLOGY

## 2018-09-21 RX ORDER — METOCLOPRAMIDE 10 MG/1
10 TABLET ORAL
Status: DISCONTINUED | OUTPATIENT
Start: 2018-09-21 | End: 2018-09-21 | Stop reason: HOSPADM

## 2018-09-21 RX ADMIN — LISINOPRIL 20 MG: 20 TABLET ORAL at 09:51

## 2018-09-21 RX ADMIN — METOCLOPRAMIDE 10 MG: 10 TABLET ORAL at 07:38

## 2018-09-21 RX ADMIN — DOCUSATE SODIUM 100 MG: 100 CAPSULE, LIQUID FILLED ORAL at 09:51

## 2018-09-21 RX ADMIN — Medication 2 G: at 00:17

## 2018-09-21 RX ADMIN — OXYCODONE HYDROCHLORIDE AND ACETAMINOPHEN 1 TABLET: 7.5; 325 TABLET ORAL at 07:38

## 2018-09-21 RX ADMIN — Medication 2 G: at 07:00

## 2018-09-21 RX ADMIN — Medication 10 ML: at 06:19

## 2018-09-21 RX ADMIN — SODIUM CHLORIDE, SODIUM LACTATE, POTASSIUM CHLORIDE, CALCIUM CHLORIDE, AND DEXTROSE MONOHYDRATE 150 ML/HR: 600; 310; 30; 20; 5 INJECTION, SOLUTION INTRAVENOUS at 03:40

## 2018-09-21 NOTE — PROGRESS NOTES
I have reviewed discharge instructions with the patient. The patient verbalized understanding.   Patient leaving with family via car to private residence

## 2018-09-21 NOTE — PROGRESS NOTES
Progress Note    Patient: Raulito Hill MRN: [id number]  SSN: [id number]    YOB: 1935  Age: 82 y.o. Sex: male        ADMITTED:  2018 to Sadia Colon MD  for LEFT RENAL CYST, LEFT RENAL MASS  Renal mass, left         Raulito Hill is 1 Day Post-Op Procedure(s):  LAPAROSCOPIC LEFT TOTAL NEPHRECTOMY . He is doing well. He has mild pain. He has no nausea. He is tolerating clear liquids. Indwelling catheter is draining well. Vitals:  Temp (24hrs), Av.8 °F (36.6 °C), Min:97.4 °F (36.3 °C), Max:98.6 °F (37 °C)     Blood pressure 164/82, pulse 62, temperature 98.6 °F (37 °C), resp. rate 16, height 6' 4.5\" (1.943 m), weight 117.5 kg (259 lb), SpO2 95 %. I&O's:   07 - 1900  In: 4100 [I. Q.:3323]  Out: 100 [Urine:100]   1901 -  0700  In: 1000   Out: 700 [Urine:700]     Exam:   abdomen soft, appropriately TTP at surgical sites. All incisions clean/dry/intact without evidence of infection. Ventura with clear urine output.      Labs:   Recent Labs      18   0421   WBC  9.3   HGB  13.3   HCT  42.5   PLT  233     Recent Labs      18   0421   NA  140   K  4.3   CL  106   CO2  28   GLU  105*   BUN  18   CREA  1.56*   CA  9.5        Cultures:      Imaging:       Assessment:     - 1 Day Post-Op Procedure(s):  LAPAROSCOPIC LEFT TOTAL NEPHRECTOMY     Active Problems:    Renal mass, left (2018)        Plan:     - h/l ivf  - oob  - diet  - home later today    Signed By: Sadia Colon MD - 2018

## 2018-09-21 NOTE — PROGRESS NOTES
Bedside shift change report given to [address] (oncoming nurse) by Elyn Nyhan (offgoing nurse). Report included the following information SBAR, Kardex, OR Summary, Intake/Output, MAR and Recent Results.

## 2018-09-21 NOTE — PROGRESS NOTES
Problem: Falls - Risk of  Goal: *Absence of Falls  Document Kendra Fall Risk and appropriate interventions in the flowsheet.    Outcome: Progressing Towards Goal  Fall Risk Interventions:  Mobility Interventions: Patient to call before getting OOB         Medication Interventions: Patient to call before getting OOB    Elimination Interventions: Call light in reach

## 2018-09-21 NOTE — PROGRESS NOTES
Bedside and Verbal shift change report given to Allison Ni rn  (oncoming nurse) by Kirt Escamilla  (offgoing nurse). Report included the following information SBAR and Kardex.

## 2018-09-25 ENCOUNTER — PATIENT OUTREACH (OUTPATIENT)
Dept: INTERNAL MEDICINE CLINIC | Age: 83
End: 2018-09-25

## 2018-09-25 NOTE — PROGRESS NOTES
Hospital Discharge Follow-Up Date/Time:  2018 3:35 PM 
 
Patient was admitted to [address] [address] on 18 and discharged on 18 for scheduled Laparoscopic left radical nephrectomy. The physician discharge summary was available at the time of outreach. Patient was contacted within 2 business days of discharge. Top Challenges reviewed with the provider B/P elevated Health Maintenance Summary need to be updated Method of communication with provider : none Inpatient RRAT score: 5 Was this a readmission? no  
Patient stated reason for the readmission: n/a Nurse Navigator (NN) contacted the wife by telephone to perform post hospital discharge assessment. Reports pt is resting. Verified name and  with family as identifiers. Provided introduction to self, and explanation of the Nurse Navigator role. Reviewed discharge instructions and red flags with family who verbalized understanding. Family given an opportunity to ask questions and does not have any further questions or concerns at this time. The family agrees to contact the PCP office for questions related to their healthcare. NN provided contact information for future reference. Disease Specific:   N/A Summary of patient's top problems: 
1. Laparoscopic hand-assisted left radical nephrectomy - wife reports minimal pain, not taking anything. No concerns at this time. Denies temp, N/V, CP, SOB, cough, wheeze or difficulty with urination or constipation. BM today. 2. ACP not on file Home Health orders at discharge: no 
[address]: n/a Date of initial visit: n/a Durable Medical Equipment ordered/company: n/a Durable Medical Equipment received: n/a Barriers to care? None at this time Advance Care Planning:  
Does patient have an Advance Directive:  not on file Medication(s):  
New Medications at Discharge: oxyCODONE-acetaminophen 5-325 mg per tablet Changed Medications at Discharge: n/a Discontinued Medications at Discharge: N/A Medication reconciliation was partially performed with family, who verbalizes understanding of administration of home medications. There were no barriers to obtaining medications identified at this time. Referral to Pharm D needed: no  
 
Current Outpatient Prescriptions Medication Sig  
 oxyCODONE-acetaminophen (PERCOCET) 5-325 mg per tablet Take 1-2 Tabs by mouth every four (4) hours as needed for Pain. Max Daily Amount: 12 Tabs.  lisinopril (PRINIVIL, ZESTRIL) 20 mg tablet TAKE 1 TABLET BY MOUTH DAILY.  ASPIRIN (ASPIR-81 PO) Take  by mouth daily. No current facility-administered medications for this visit. There are no discontinued medications. [facility] follow up appointment(s): Future Appointments Date Time Provider Sofia Mary 10/3/2018 11:30 AM Valentine Snider MD [facility] follow up appointment(s): surgeon - 9/27/18 [facility]:  n/a  
 
 
Goals  Attends follow-up appointments as directed. Planning to attend appt with surgeon 9/27/18 & PCP 10/3/18  Understands red flags post discharge.

## 2018-09-25 NOTE — DISCHARGE SUMMARY
Urology Discharge Summary    Patient: Ronit Alfaro MRN: [id number]  SSN: [id number]    YOB: 1935  Age: 82 y.o. Sex: male                 FOLLOWUP: Follow-up Appointments   Procedures    FOLLOW UP VISIT Appointment in: One Week     Standing Status:   Standing     Number of Occurrences:   1     Order Specific Question:   Appointment in     Answer: One Week      MEDS: Cannot display discharge medications since this patient is not currently admitted. ADMISSION: 9/20/2018 by Ludmila Caceres MD to Home or Self Care  The encounter diagnosis was Renal mass, left. DISCHARGE: POD 1  to Home or Self Care   PROCEDURES: 5 Days Post-Op Procedure(s):  LAPAROSCOPIC LEFT TOTAL NEPHRECTOMY    CONSULTS: None   RECENT LABS: Lab Results   Component Value Date/Time    WBC 9.3 09/21/2018 04:21 AM    HCT 42.5 09/21/2018 04:21 AM    PLATELET 446 27/43/5835 04:21 AM    Sodium 140 09/21/2018 04:21 AM    Potassium 4.3 09/21/2018 04:21 AM    Chloride 106 09/21/2018 04:21 AM    CO2 28 09/21/2018 04:21 AM    BUN 18 09/21/2018 04:21 AM    Creatinine 1.56 (H) 09/21/2018 04:21 AM    Glucose 105 (H) 09/21/2018 04:21 AM    Calcium 9.5 09/21/2018 04:21 AM      CALL FOR: If fever > 101 F, wound redness, discharge, foul odor, inability to urinate (or catheter not draining if in place), worsening abdominal pain, distension, or vomiting please call [facility], Ludmila Caceres MD,at [phone]. IMAGING: none  PATHOLOGY: none    HOSPITAL COURSE: uncomplicated. COMPLICATIONS: None identified. DISCHARGE TO:     Home.     Ludmila Caceres MD 9/25/2018 7:45 AM

## 2018-10-03 ENCOUNTER — OFFICE VISIT (OUTPATIENT)
Dept: INTERNAL MEDICINE CLINIC | Age: 83
End: 2018-10-03

## 2018-10-03 VITALS
RESPIRATION RATE: 18 BRPM | HEART RATE: 97 BPM | TEMPERATURE: 97.5 F | WEIGHT: 245 LBS | SYSTOLIC BLOOD PRESSURE: 142 MMHG | BODY MASS INDEX: 29.83 KG/M2 | DIASTOLIC BLOOD PRESSURE: 82 MMHG | HEIGHT: 76 IN | OXYGEN SATURATION: 95 %

## 2018-10-03 DIAGNOSIS — I10 BENIGN ESSENTIAL HTN: ICD-10-CM

## 2018-10-03 DIAGNOSIS — R53.83 FATIGUE, UNSPECIFIED TYPE: ICD-10-CM

## 2018-10-03 DIAGNOSIS — Z90.5 HX OF UNILATERAL NEPHRECTOMY: ICD-10-CM

## 2018-10-03 DIAGNOSIS — C64.2 CLEAR CELL RENAL CELL CARCINOMA, LEFT (HCC): Primary | ICD-10-CM

## 2018-10-03 PROBLEM — N28.89 LEFT RENAL MASS: Status: RESOLVED | Noted: 2018-07-01 | Resolved: 2018-10-03

## 2018-10-03 PROBLEM — N28.89 RENAL MASS, LEFT: Status: RESOLVED | Noted: 2018-09-20 | Resolved: 2018-10-03

## 2018-10-03 NOTE — MR AVS SNAPSHOT
Lonnie Salazar 
 
 
 [address] [facility] [address] [facility] 
[phone] Patient: Yohan Caldwell MRN: [id number] Visit Information Date & Time Provider Department Dept. Phone Encounter #  
 10/3/2018 11:30 AM Jaime Thompson MD [facility] [address] [id number] Upcoming Health Maintenance Date Due  
 MEDICARE YEARLY EXAM 1/3/2019* GLAUCOMA SCREENING Q2Y 9/9/2019* Pneumococcal 65+ High/Highest Risk (1 of 2 - PCV13) 10/3/2023* Shingrix Vaccine Age 50> (1 of 2) 10/3/2023* DTaP/Tdap/Td series (2 - Td) 7/7/2027 *Topic was postponed. The date shown is not the original due date. Allergies as of 10/3/2018  Review Complete On: 10/3/2018 By: Jaime Thompson MD  
  
 Severity Noted Reaction Type Reactions Influenza Virus Vaccine, Specific  02/20/2017    Other (comments) \"made him sick\" Current Immunizations  Reviewed on 2/20/2017 No immunizations on file. Not reviewed this visit You Were Diagnosed With   
  
 Codes Comments Clear cell renal cell carcinoma, left (HCC)    -  Primary ICD-10-CM: C64.2 ICD-9-CM: 189.0 Hx of unilateral nephrectomy     ICD-10-CM: Z90.5 ICD-9-CM: V45.73 Fatigue, unspecified type     ICD-10-CM: R53.83 ICD-9-CM: 780.79 Benign essential HTN     ICD-10-CM: I10 
ICD-9-CM: 401.1 Vitals BP Pulse Temp Resp Height(growth percentile) Weight(growth percentile) 142/82 (BP 1 Location: Left arm, BP Patient Position: Sitting) 97 97.5 °F (36.4 °C) (Oral) 18 6' 4\" (1.93 m) 245 lb (111.1 kg) SpO2 BMI Smoking Status 95% 29.82 kg/m2 Former Smoker Vitals History BMI and BSA Data Body Mass Index Body Surface Area  
 29.82 kg/m 2 2.44 m 2 Preferred Pharmacy Pharmacy Name Phone [id number] [place], [address] [facility] [phone] Your Updated Medication List  
 This list is accurate as of 10/3/18 12:23 PM.  Always use your most recent med list.  
  
  
  
  
 ASPIR-81 PO Take  by mouth daily. lisinopril 20 mg tablet Commonly known as:  PRINIVIL, ZESTRIL  
TAKE 1 TABLET BY MOUTH DAILY. oxyCODONE-acetaminophen 5-325 mg per tablet Commonly known as:  PERCOCET Take 1-2 Tabs by mouth every four (4) hours as needed for Pain. Max Daily Amount: 12 Tabs. Introducing [facility]! [facility] introduces timeplazza patient portal. Now you can access parts of your medical record, email your doctor's office, and request medication refills online. 1. In your internet browser, go to [email or web address] 2. Click on the First Time User? Click Here link in the Sign In box. You will see the New Member Sign Up page. 3. Enter your timeplazza Access Code exactly as it appears below. You will not need to use this code after youve completed the sign-up process. If you do not sign up before the expiration date, you must request a new code. · timeplazza Access Code: [email or web address] Expires: 12/28/2018  5:23 AM 
 
4. Enter the last four digits of your Social Security Number (xxxx) and Date of Birth (mm/dd/yyyy) as indicated and click Submit. You will be taken to the next sign-up page. 5. Create a timeplazza ID. This will be your timeplazza login ID and cannot be changed, so think of one that is secure and easy to remember. 6. Create a timeplazza password. You can change your password at any time. 7. Enter your Password Reset Question and Answer. This can be used at a later time if you forget your password. 8. Enter your e-mail address. You will receive e-mail notification when new information is available in [address] 9. Click Sign Up. You can now view and download portions of your medical record. 10. Click the Download Summary menu link to download a portable copy of your medical information. If you have questions, please visit the Frequently Asked Questions section of the [employer] website. Remember, [employer] is NOT to be used for urgent needs. For medical emergencies, dial 911. Now available from your iPhone and Android! Please provide this summary of care documentation to your next provider. Your primary care clinician is listed as Attila Toney. If you have any questions after today's visit, please call [phone].

## 2018-10-04 NOTE — PROGRESS NOTES
HISTORY OF PRESENT ILLNESS    Chief Complaint   Patient presents with   [facility] Follow Up     L kidney removal     Presents for 14-day transitional care management (TCM) visit s/p of hospital admission. Nurse Navigator call noted to patient and documented in [facility]. Hospital record and relevant lab results, test results and consult notes have personally been reviewed by me at this office visit. Medications reviewed and reconciled. S/p left nephrectomy. Tolerated well. Hypertension  Hypertension ROS: taking medications as instructed, no medication side effects noted, no TIA's, no chest pain on exertion, no dyspnea on exertion, no swelling of ankles     reports that he quit smoking about 30 years ago. He has a 5.00 pack-year smoking history. He has never used smokeless tobacco.    reports that he drinks about 1.8 oz of alcohol per week    BP Readings from Last 2 Encounters:   10/03/18 142/82   09/21/18 (!) 166/99         Review of Systems   All other systems reviewed and are negative, except as noted in HPI    Past Medical and Surgical History   has a past medical history of Arthritis; Benign essential HTN; Clear cell renal cell carcinoma, left (HCC) (10/03/2018); GERD (gastroesophageal reflux disease); Hernia; Iron deficiency anemia; Pulmonary emboli ([address] (2014); and White coat syndrome with hypertension (2/15/2018). has a past surgical history that includes hx orthopaedic; hx colonoscopy (2014); hx endoscopy (2014); and hx nephrectomy (Left, 09/20/2018). reports that he quit smoking about 30 years ago. He has a 5.00 pack-year smoking history. He has never used smokeless tobacco. He reports that he drinks about 1.8 oz of alcohol per week  He reports that he does not use illicit drugs. family history includes Cancer in his brother; No Known Problems in his father, mother, and sister; Stroke in his sister. Physical Exam   Nursing note and vitals reviewed.   Blood pressure 142/82, pulse 97, temperature 97.5 °F (36.4 °C), temperature source Oral, resp. rate 18, height 6' 4\" (1.93 m), weight 245 lb (111.1 kg), SpO2 95 %. Constitutional:  No distress. Eyes: Conjunctivae are normal.   Ears:  Hearing grossly intact  Cardiovascular: Normal rate. regular rhythm, no murmurs or gallops  No edema  Pulmonary/Chest: Effort normal.   CTAB  Musculoskeletal: moves all 4 extremities   Neurological: Alert and oriented to person, place, and time. Skin: No rash noted. Psychiatric: Normal mood and affect. Behavior is normal.     ASSESSMENT and PLAN  Diagnoses and all orders for this visit:    1. Clear cell renal cell carcinoma, left (HCC)  2. Hx of unilateral nephrectomy  Doing well. 3. Fatigue, unspecified type  Mild, non-specific    4. Benign essential HTN  based on my history, the overall control of this problem borderline controlled. lab results and schedule of future lab studies reviewed with patient  reviewed medications and side effects in detail    Return to clinic for further evaluation if new symptoms develop    Follow-up Disposition: Not on File    Current Outpatient Prescriptions   Medication Sig    lisinopril (PRINIVIL, ZESTRIL) 20 mg tablet TAKE 1 TABLET BY MOUTH DAILY.  ASPIRIN (ASPIR-81 PO) Take  by mouth daily.  oxyCODONE-acetaminophen (PERCOCET) 5-325 mg per tablet Take 1-2 Tabs by mouth every four (4) hours as needed for Pain. Max Daily Amount: 12 Tabs. No current facility-administered medications for this visit.

## 2018-11-02 DIAGNOSIS — I10 BENIGN ESSENTIAL HTN: ICD-10-CM

## 2018-11-02 RX ORDER — LISINOPRIL 20 MG/1
TABLET ORAL
Qty: 90 TAB | Refills: 1 | Status: SHIPPED | OUTPATIENT
Start: 2018-11-02 | End: 2019-07-03 | Stop reason: SDUPTHER

## 2019-02-11 ENCOUNTER — OFFICE VISIT (OUTPATIENT)
Dept: INTERNAL MEDICINE CLINIC | Age: 84
End: 2019-02-11

## 2019-02-11 VITALS
BODY MASS INDEX: 31.93 KG/M2 | SYSTOLIC BLOOD PRESSURE: 168 MMHG | OXYGEN SATURATION: 95 % | HEIGHT: 76 IN | HEART RATE: 91 BPM | TEMPERATURE: 97.9 F | RESPIRATION RATE: 18 BRPM | WEIGHT: 262.25 LBS | DIASTOLIC BLOOD PRESSURE: 98 MMHG

## 2019-02-11 DIAGNOSIS — Z00.00 MEDICARE ANNUAL WELLNESS VISIT, SUBSEQUENT: Primary | ICD-10-CM

## 2019-02-11 DIAGNOSIS — C64.2 CLEAR CELL RENAL CELL CARCINOMA, LEFT (HCC): ICD-10-CM

## 2019-02-11 DIAGNOSIS — I10 BENIGN ESSENTIAL HTN: ICD-10-CM

## 2019-02-11 RX ORDER — OMEPRAZOLE 20 MG/1
20 CAPSULE, DELAYED RELEASE ORAL DAILY
COMMUNITY

## 2019-02-11 RX ORDER — HYDROCHLOROTHIAZIDE 12.5 MG/1
12.5 TABLET ORAL DAILY
Qty: 30 TAB | Refills: 2 | Status: SHIPPED | OUTPATIENT
Start: 2019-02-11 | End: 2019-05-19 | Stop reason: SDUPTHER

## 2019-02-11 NOTE — ACP (ADVANCE CARE PLANNING)
Advance Care Planning (ACP) Provider Note - Comprehensive     Date of ACP Conversation: 02/11/19  Persons included in Conversation:  patient  Length of ACP Conversation in minutes:  <16 minutes (Non-Billable)    Authorized Decision Maker (if patient is incapable of making informed decisions): This person is:  Healthcare Agent/Medical Power of  under Advance Directive          General ACP for ALL Patients with Decision Making Capacity:   Importance of advance care planning, including choosing a healthcare agent to communicate patient's healthcare decisions if patient lost the ability to make decisions, such as after a sudden illness or accident  Understanding of the healthcare agent role was assessed and information provided  Exploration of values, goals, and preferences if recovery is not expected, even with continued medical treatment in the event of: Imminent death  Severe, permanent brain injury    Review of Existing Advance Directive:  not availble     For Serious or Chronic Illness:  Understanding of medical condition    Understanding of CPR, goals and expected outcomes, benefits and burdens discussed. Information on CPR success rates provided (e.g. for CPR in hospital, survival to d/c at two weeks is 22%, for chronically ill or elderly/frail survival is less than 3%); Individual asked to communicate understanding of information in his/her own words.   Explored fears and concerns regarding CPR or possible outcomes    Interventions Provided:  Recommended completion of Advance Directive form after review of ACP materials and conversation with prospective healthcare agent   Recommended communicating the plan and making copies for the healthcare agent, personal physician, and others as appropriate (e.g., health system)

## 2019-02-11 NOTE — PATIENT INSTRUCTIONS
Body Mass Index: Care Instructions Your Care Instructions Body mass index (BMI) can help you see if your weight is raising your risk for health problems. It uses a formula to compare how much you weigh with how tall you are. · A BMI lower than 18.5 is considered underweight. · A BMI between 18.5 and 24.9 is considered healthy. · A BMI between 25 and 29.9 is considered overweight. A BMI of 30 or higher is considered obese. If your BMI is in the normal range, it means that you have a lower risk for weight-related health problems. If your BMI is in the overweight or obese range, you may be at increased risk for weight-related health problems, such as high blood pressure, heart disease, stroke, arthritis or joint pain, and diabetes. If your BMI is in the underweight range, you may be at increased risk for health problems such as fatigue, lower protection (immunity) against illness, muscle loss, bone loss, hair loss, and hormone problems. BMI is just one measure of your risk for weight-related health problems. You may be at higher risk for health problems if you are not active, you eat an unhealthy diet, or you drink too much alcohol or use tobacco products. Follow-up care is a key part of your treatment and safety. Be sure to make and go to all appointments, and call your doctor if you are having problems. It's also a good idea to know your test results and keep a list of the medicines you take. How can you care for yourself at home? · Practice healthy eating habits. This includes eating plenty of fruits, vegetables, whole grains, lean protein, and low-fat dairy. · If your doctor recommends it, get more exercise. Walking is a good choice. Bit by bit, increase the amount you walk every day. Try for at least 30 minutes on most days of the week. · Do not smoke. Smoking can increase your risk for health problems.  If you need help quitting, talk to your doctor about stop-smoking programs and medicines. These can increase your chances of quitting for good. · Limit alcohol to 2 drinks a day for men and 1 drink a day for women. Too much alcohol can cause health problems. If you have a BMI higher than 25 · Your doctor may do other tests to check your risk for weight-related health problems. This may include measuring the distance around your waist. A waist measurement of more than 40 inches in men or 35 inches in women can increase the risk of weight-related health problems. · Talk with your doctor about steps you can take to stay healthy or improve your health. You may need to make lifestyle changes to lose weight and stay healthy, such as changing your diet and getting regular exercise. If you have a BMI lower than 18.5 · Your doctor may do other tests to check your risk for health problems. · Talk with your doctor about steps you can take to stay healthy or improve your health. You may need to make lifestyle changes to gain or maintain weight and stay healthy, such as getting more healthy foods in your diet and doing exercises to build muscle. Where can you learn more? Go to http://tarun-camelia.info/. Enter S176 in the search box to learn more about \"Body Mass Index: Care Instructions. \" Current as of: October 13, 2016 Content Version: 11.4 © 5933-7322 [employer]wise[employer]. Care instructions adapted under license by [employer] (which disclaims liability or warranty for this information). If you have questions about a medical condition or this instruction, always ask your healthcare professional. [place] [address] any warranty or liability for your use of this information. Well Visit, Over 72: Care Instructions Your Care Instructions Physical exams can help you stay healthy. Your doctor has checked your overall health and may have suggested ways to take good care of yourself. He or she also may have recommended tests. At home, you can help prevent illness with healthy eating, regular exercise, and other steps. Follow-up care is a key part of your treatment and safety. Be sure to make and go to all appointments, and call your doctor if you are having problems. It's also a good idea to know your test results and keep a list of the medicines you take. How can you care for yourself at home? · Reach and stay at a healthy weight. This will lower your risk for many problems, such as obesity, diabetes, heart disease, and high blood pressure. · Get at least 30 minutes of exercise on most days of the week. Walking is a good choice. You also may want to do other activities, such as running, swimming, cycling, or playing tennis or team sports. · Do not smoke. Smoking can make health problems worse. If you need help quitting, talk to your doctor about stop-smoking programs and medicines. These can increase your chances of quitting for good. · Protect your skin from too much sun. When you're outdoors from 10 a.m. to 4 p.m., stay in the shade or cover up with clothing and a hat with a wide brim. Wear sunglasses that block UV rays. Even when it's cloudy, put broad-spectrum sunscreen (SPF 30 or higher) on any exposed skin. · See a dentist one or two times a year for checkups and to have your teeth cleaned. · Wear a seat belt in the car. · Limit alcohol to 2 drinks a day for men and 1 drink a day for women. Too much alcohol can cause health problems. Follow your doctor's advice about when to have certain tests. These tests can spot problems early. For men and women · Cholesterol. Your doctor will tell you how often to have this done based on your overall health and other things that can increase your risk for heart attack and stroke. · Blood pressure. Have your blood pressure checked during a routine doctor visit.  Your doctor will tell you how often to check your blood pressure based on your age, your blood pressure results, and other factors. · Diabetes. Ask your doctor whether you should have tests for diabetes. · Vision. Experts recommend that you have yearly exams for glaucoma and other age-related eye problems. · Hearing. Tell your doctor if you notice any change in your hearing. You can have tests to find out how well you hear. · Colon cancer tests. Keep having colon cancer tests as your doctor recommends. You can have one of several types of tests. · Heart attack and stroke risk. At least every 4 to 6 years, you should have your risk for heart attack and stroke assessed. Your doctor uses factors such as your age, blood pressure, cholesterol, and whether you smoke or have diabetes to show what your risk for a heart attack or stroke is over the next 10 years. · Osteoporosis. Talk to your doctor about whether you should have a bone density test to find out whether you have thinning bones. Also ask your doctor about whether you should take calcium and vitamin D supplements. For women · Pap test and pelvic exam. You may no longer need a Pap test. Talk with your doctor about whether to stop or continue to have Pap tests. · Breast exam and mammogram. Ask how often you should have a mammogram, which is an X-ray of your breasts. A mammogram can spot breast cancer before it can be felt and when it is easiest to treat. · Thyroid disease. Talk to your doctor about whether to have your thyroid checked as part of a regular physical exam. Women have an increased chance of a thyroid problem. For men · Prostate exam. Talk to your doctor about whether you should have a blood test (called a PSA test) for prostate cancer. Experts disagree on whether men should have this test. Some experts recommend that you discuss the benefits and risks of the test with your doctor. · Abdominal aortic aneurysm.  Ask your doctor whether you should have a test to check for an aneurysm. You may need a test if you ever smoked or if your parent, brother, sister, or child has had an aneurysm. When should you call for help? Watch closely for changes in your health, and be sure to contact your doctor if you have any problems or symptoms that concern you. Where can you learn more? Go to http://tarun-camelia.info/. Enter D915 in the search box to learn more about \"Well Visit, Over 65: Care Instructions. \" Current as of: March 28, 2018 Content Version: 11.9 © 6785-2630 [employer]. Care instructions adapted under license by [employer] (which disclaims liability or warranty for this information). If you have questions about a medical condition or this instruction, always ask your healthcare professional. [address] any warranty or liability for your use of this information.

## 2019-02-11 NOTE — PROGRESS NOTES
This is a Subsequent Medicare Annual Wellness Visit providing Personalized Prevention Plan Services (PPPS) (Performed 12 months after initial AWV and PPPS ) I have reviewed the patient's medical history in detail and updated the computerized patient record. Hypertension Hypertension ROS: taking medications as instructed, no medication side effects noted, no TIA's, no chest pain on exertion, no dyspnea on exertion Reports mild swelling of ankles     reports that he quit smoking about 31 years ago. He has a 5.00 pack-year smoking history. he has never used smokeless tobacco.    reports that he drinks about 1.8 oz of alcohol per week. BP Readings from Last 2 Encounters:  
02/11/19 (!) 168/98  
10/03/18 142/82 History Past Medical History:  
Diagnosis Date  Arthritis  Benign essential HTN   
 took medication  Clear cell renal cell carcinoma, left (HCC) 10/03/2018  
 6.2 cm, neg nodes, unifocal  
 GERD (gastroesophageal reflux disease)   
 occasional r/t food intake  Hernia  Iron deficiency anemia Dr. Vasquez Hines. 2014. on Xarelto. did iron infusions.  Pulmonary emboli ([address] 2014 Dr. Tristin Quigley. Xarelto caused anemia  White coat syndrome with hypertension 2/15/2018 Past Surgical History:  
Procedure Laterality Date  HX COLONOSCOPY  2014  
 no source of bleeding  HX ENDOSCOPY  2014  
 no sournce of bleeding  HX NEPHRECTOMY Left 09/20/2018  
 renal cell carcinoma. Dr. Gentry Almodovar  HX ORTHOPAEDIC    
 trigger fingers both pinky Current Outpatient Medications Medication Sig  
 omeprazole (PRILOSEC) 20 mg capsule Take 20 mg by mouth daily.  hydroCHLOROthiazide (HYDRODIURIL) 12.5 mg tablet Take 1 Tab by mouth daily.  lisinopril (PRINIVIL, ZESTRIL) 20 mg tablet TAKE 1 TABLET BY MOUTH EVERY DAY  ASPIRIN (ASPIR-81 PO) Take  by mouth daily. No current facility-administered medications for this visit. Allergies Allergen Reactions  Influenza Virus Vaccine, Specific Other (comments) \"made him sick\" Family History Problem Relation Age of Onset  Stroke Sister  Cancer Brother   
     growth on kidney  No Known Problems Mother  No Known Problems Father  No Known Problems Sister   
 
 
 reports that he quit smoking about 31 years ago. He has a 5.00 pack-year smoking history. he has never used smokeless tobacco. 
 reports that he drinks about 1.8 oz of alcohol per week. Depression Risk Factor Screening:  
 
 
Alcohol Risk Factor Screening: On any occasion during the past 3 months, have you had more than 3 drinks containing alcohol? No 
 
Do you average more than 14 drinks per week? No 
 
 
Functional Ability and Level of Safety:  
 
Hearing Loss  
mild Activities of Daily Living Self-care. Requires assistance with: no ADLs Fall Risk Fall Risk Assessment, last 12 mths 2/11/2019 Able to walk? Yes Fall in past 12 months? No  
 
 
 
Abuse Screen Patient is not abused Review of Systems A comprehensive review of systems was negative except for that written in the HPI. Physical Examination Evaluation of Cognitive Function: 
Mood/affect:  neutral, happy Appearance: age appropriate Family member/caregiver input: none Blood pressure (!) 168/98, pulse 91, temperature 97.9 °F (36.6 °C), temperature source Oral, resp. rate 18, height 6' 4\" (1.93 m), weight 262 lb 4 oz (119 kg), SpO2 95 %. General appearance: alert, cooperative, no distress, appears stated age Neck: supple, symmetrical, trachea midline, no adenopathy, thyroid: not enlarged, symmetric, no tenderness/mass/nodules, no carotid bruit and no JVD Lungs: clear to auscultation bilaterally Heart: regular rate and rhythm, S1, S2 normal, no murmur, click, rub or gallop Extremities: extremities normal, atraumatic, no cyanosis or edema Patient Care Team: 
Kiran Fraser MD as PCP - General (Internal Medicine) Advice/Referrals/Counseling Education and counseling provided. See below for specific orders Assessment/Plan Diagnoses and all orders for this visit: 
 
1. Medicare annual wellness visit, subsequent 2. Benign essential HTN Uncontrolled. -     Start hydroCHLOROthiazide (HYDRODIURIL) 12.5 mg tablet; Take 1 Tab by mouth daily. Monitor renal fxn closely 3. Clear cell renal cell carcinoma, left (HCC) Per urology. Labs pending. Michi Velasquez Potential medication side effects were discussed with the patient; let me know if any occur. Return for yearly Annual Wellness Visits Follow-up Disposition: 
Return in about 2 months (around 4/11/2019) for blood pressure check. and labs Discussed the patient's BMI with him. The BMI follow up plan is as follows:  
 
dietary management education, guidance, and counseling 
encourage exercise 
monitor weight 
prescribed dietary intake An After Visit Summary was printed and given to the patient.

## 2019-07-02 ENCOUNTER — HOSPITAL ENCOUNTER (OUTPATIENT)
Dept: LAB | Age: 84
Discharge: HOME | End: 2019-07-02
Payer: MEDICARE

## 2019-07-02 ENCOUNTER — OFFICE VISIT (OUTPATIENT)
Dept: INTERNAL MEDICINE CLINIC | Age: 84
End: 2019-07-02

## 2019-07-02 VITALS
SYSTOLIC BLOOD PRESSURE: 148 MMHG | WEIGHT: 256.8 LBS | DIASTOLIC BLOOD PRESSURE: 75 MMHG | HEART RATE: 73 BPM | HEIGHT: 76 IN | BODY MASS INDEX: 31.27 KG/M2 | TEMPERATURE: 97.6 F | RESPIRATION RATE: 18 BRPM | OXYGEN SATURATION: 93 %

## 2019-07-02 DIAGNOSIS — I10 ESSENTIAL HYPERTENSION: Primary | ICD-10-CM

## 2019-07-02 DIAGNOSIS — D50.9 IRON DEFICIENCY ANEMIA, UNSPECIFIED IRON DEFICIENCY ANEMIA TYPE: ICD-10-CM

## 2019-07-02 DIAGNOSIS — I10 WHITE COAT SYNDROME WITH HYPERTENSION: ICD-10-CM

## 2019-07-02 PROCEDURE — 85027 COMPLETE CBC AUTOMATED: CPT

## 2019-07-02 PROCEDURE — 80048 BASIC METABOLIC PNL TOTAL CA: CPT

## 2019-07-02 PROCEDURE — 36415 COLL VENOUS BLD VENIPUNCTURE: CPT

## 2019-07-02 NOTE — PROGRESS NOTES
HISTORY OF PRESENT ILLNESS    Chief Complaint   Patient presents with    Hypertension     meds refill bp check       Presents for follow-up    Hypertension - started hctz 2/11/19 12.5 mg daily. Says home BPs are 120s/70  Hypertension ROS: taking medications as instructed, no medication side effects noted, no TIA's, no chest pain on exertion, no dyspnea on exertion, no swelling of ankles     reports that he quit smoking about 31 years ago. He has a 5.00 pack-year smoking history. He has never used smokeless tobacco.    reports that he drinks about 1.8 oz of alcohol per week. BP Readings from Last 2 Encounters:   07/02/19 148/75   02/11/19 (!) 168/98         Review of Systems   All other systems reviewed and are negative, except as noted in HPI    Past Medical and Surgical History   has a past medical history of Arthritis, Benign essential HTN, Clear cell renal cell carcinoma, left ([facility] [place] [address] (10/03/2018), GERD (gastroesophageal reflux disease), Hernia, Iron deficiency anemia, Pulmonary emboli ([facility] [place] [address] (2014), and White coat syndrome with hypertension (2/15/2018). has a past surgical history that includes hx orthopaedic; hx colonoscopy (2014); hx endoscopy (2014); and hx nephrectomy (Left, 09/20/2018). reports that he quit smoking about 31 years ago. He has a 5.00 pack-year smoking history. He has never used smokeless tobacco. He reports that he drinks about 1.8 oz of alcohol per week. He reports that he does not use drugs. family history includes Cancer in his brother; No Known Problems in his father, mother, and sister; Stroke in his sister. Physical Exam   Nursing note and vitals reviewed. Blood pressure 148/75, pulse 73, temperature 97.6 °F (36.4 °C), temperature source Oral, resp. rate 18, height 6' 4\" (1.93 m), weight 256 lb 12.8 oz (116.5 kg), SpO2 93 %. Constitutional:  No distress. Eyes: Conjunctivae are normal.   Ears:  Hearing grossly intact  Cardiovascular: Normal rate.   regular rhythm, no murmurs or gallops  No edema  Pulmonary/Chest: Effort normal.   CTAB  Musculoskeletal: moves all 4 extremities   Neurological: Alert and oriented to person, place, and time. Skin: No rash noted. Psychiatric: Normal mood and affect. Behavior is normal.     ASSESSMENT and PLAN  Diagnoses and all orders for this visit:    1. Essential hypertension  -     METABOLIC PANEL, BASIC  2. White coat syndrome with hypertension  based on my history, the overall control of this problem borderline controlled. Home BP is better. 3. Iron deficiency anemia, unspecified iron deficiency anemia type  Lab Results   Component Value Date/Time    HGB 13.3 09/21/2018 04:21 AM   -     CBC W/O DIFF        There are no Patient Instructions on file for this visit.    lab results and schedule of future lab studies reviewed with patient  reviewed medications and side effects in detail    Return to clinic for further evaluation if new symptoms develop        Current Outpatient Medications   Medication Sig    hydroCHLOROthiazide (HYDRODIURIL) 12.5 mg tablet TAKE 1 TABLET BY MOUTH DAILY    omeprazole (PRILOSEC) 20 mg capsule Take 20 mg by mouth daily.  lisinopril (PRINIVIL, ZESTRIL) 20 mg tablet TAKE 1 TABLET BY MOUTH EVERY DAY    ASPIRIN (ASPIR-81 PO) Take  by mouth daily. No current facility-administered medications for this visit.

## 2019-07-03 DIAGNOSIS — I10 BENIGN ESSENTIAL HTN: ICD-10-CM

## 2019-07-03 LAB
BUN SERPL-MCNC: 26 MG/DL (ref 8–27)
BUN/CREAT SERPL: 15 (ref 10–24)
CALCIUM SERPL-MCNC: 9.6 MG/DL (ref 8.6–10.2)
CHLORIDE SERPL-SCNC: 107 MMOL/L (ref 96–106)
CO2 SERPL-SCNC: 21 MMOL/L (ref 20–29)
CREAT SERPL-MCNC: 1.68 MG/DL (ref 0.76–1.27)
ERYTHROCYTE [DISTWIDTH] IN BLOOD BY AUTOMATED COUNT: 13.1 % (ref 12.3–15.4)
GLUCOSE SERPL-MCNC: 90 MG/DL (ref 65–99)
HCT VFR BLD AUTO: 40.6 % (ref 37.5–51)
HGB BLD-MCNC: 13.2 G/DL (ref 13–17.7)
INTERPRETATION: NORMAL
MCH RBC QN AUTO: 30.7 PG (ref 26.6–33)
MCHC RBC AUTO-ENTMCNC: 32.5 G/DL (ref 31.5–35.7)
MCV RBC AUTO: 94 FL (ref 79–97)
PLATELET # BLD AUTO: 233 X10E3/UL (ref 150–450)
POTASSIUM SERPL-SCNC: 4.8 MMOL/L (ref 3.5–5.2)
RBC # BLD AUTO: 4.3 X10E6/UL (ref 4.14–5.8)
SODIUM SERPL-SCNC: 139 MMOL/L (ref 134–144)
WBC # BLD AUTO: 5.8 X10E3/UL (ref 3.4–10.8)

## 2019-07-03 RX ORDER — AMLODIPINE BESYLATE 5 MG/1
5 TABLET ORAL DAILY
Qty: 30 TAB | Refills: 2 | Status: SHIPPED | OUTPATIENT
Start: 2019-07-03 | End: 2019-09-28 | Stop reason: SDUPTHER

## 2019-07-03 RX ORDER — LISINOPRIL 20 MG/1
TABLET ORAL
Qty: 90 TAB | Refills: 1 | Status: SHIPPED | OUTPATIENT
Start: 2019-07-03 | End: 2019-12-20 | Stop reason: SDUPTHER

## 2019-07-03 NOTE — PROGRESS NOTES
Let him know his kidney function is just a little worse and I prefer he STOP HCTZ and start amlodipine 5 mg daily for blood pressure. rx sent. Keep taking lisinopril.   See me in 2 months for BP check and lab

## 2019-09-29 RX ORDER — AMLODIPINE BESYLATE 5 MG/1
5 TABLET ORAL DAILY
Qty: 90 TAB | Refills: 0 | Status: SHIPPED | OUTPATIENT
Start: 2019-09-29 | End: 2019-12-24

## 2019-12-20 DIAGNOSIS — I10 BENIGN ESSENTIAL HTN: ICD-10-CM

## 2019-12-20 RX ORDER — LISINOPRIL 20 MG/1
TABLET ORAL
Qty: 90 TAB | Refills: 1 | Status: SHIPPED | OUTPATIENT
Start: 2019-12-20 | End: 2020-09-14

## 2019-12-20 NOTE — TELEPHONE ENCOUNTER
Per patient's wife patient only has two pills left and Needs his refill prior to the weekend due to leaving town .

## 2020-05-14 RX ORDER — AMLODIPINE BESYLATE 5 MG/1
5 TABLET ORAL DAILY
Qty: 90 TAB | Refills: 1 | Status: SHIPPED | OUTPATIENT
Start: 2020-05-14 | End: 2020-08-06 | Stop reason: ALTCHOICE

## 2020-07-30 ENCOUNTER — TELEPHONE (OUTPATIENT)
Dept: INTERNAL MEDICINE CLINIC | Age: 85
End: 2020-07-30

## 2020-07-30 NOTE — TELEPHONE ENCOUNTER
Per wife, pt has been experiencing dizziness and almost passed out at son's house. Asked to see PCP only tomorrow, no openings. Nurse stated to take pt to ER, wife stated pt does not want to go. Pt scheduled for next available on 8/6/2020 and will call periodically to see if anyone has canceled. Thanks.

## 2020-08-06 ENCOUNTER — OFFICE VISIT (OUTPATIENT)
Dept: INTERNAL MEDICINE CLINIC | Age: 85
End: 2020-08-06
Payer: MEDICARE

## 2020-08-06 ENCOUNTER — HOSPITAL ENCOUNTER (OUTPATIENT)
Dept: LAB | Age: 85
Discharge: HOME | End: 2020-08-06

## 2020-08-06 VITALS
SYSTOLIC BLOOD PRESSURE: 108 MMHG | DIASTOLIC BLOOD PRESSURE: 72 MMHG | TEMPERATURE: 98 F | OXYGEN SATURATION: 95 % | RESPIRATION RATE: 14 BRPM | HEIGHT: 76 IN | HEART RATE: 92 BPM | BODY MASS INDEX: 31.56 KG/M2 | WEIGHT: 259.2 LBS

## 2020-08-06 DIAGNOSIS — I10 BENIGN ESSENTIAL HTN: ICD-10-CM

## 2020-08-06 DIAGNOSIS — Z00.00 MEDICARE ANNUAL WELLNESS VISIT, SUBSEQUENT: Primary | ICD-10-CM

## 2020-08-06 DIAGNOSIS — R42 ORTHOSTATIC DIZZINESS: ICD-10-CM

## 2020-08-06 DIAGNOSIS — D50.9 IRON DEFICIENCY ANEMIA, UNSPECIFIED IRON DEFICIENCY ANEMIA TYPE: ICD-10-CM

## 2020-08-06 DIAGNOSIS — I10 ESSENTIAL HYPERTENSION: ICD-10-CM

## 2020-08-06 DIAGNOSIS — Z90.5 HX OF UNILATERAL NEPHRECTOMY: ICD-10-CM

## 2020-08-06 PROBLEM — N18.30 CKD (CHRONIC KIDNEY DISEASE) STAGE 3, GFR 30-59 ML/MIN (HCC): Status: ACTIVE | Noted: 2020-08-06

## 2020-08-06 LAB
ALBUMIN SERPL-MCNC: 3.6 G/DL (ref 3.5–5)
ALBUMIN/GLOB SERPL: 0.9 {RATIO} (ref 1.1–2.2)
ALP SERPL-CCNC: 92 U/L (ref 45–117)
ALT SERPL-CCNC: 17 U/L (ref 12–78)
ANION GAP SERPL CALC-SCNC: 5 MMOL/L (ref 5–15)
AST SERPL-CCNC: 14 U/L (ref 15–37)
BASOPHILS # BLD: 0.1 K/UL (ref 0–0.1)
BASOPHILS NFR BLD: 1 % (ref 0–1)
BILIRUB SERPL-MCNC: 0.3 MG/DL (ref 0.2–1)
BUN SERPL-MCNC: 27 MG/DL (ref 6–20)
BUN/CREAT SERPL: 16 (ref 12–20)
CALCIUM SERPL-MCNC: 9.6 MG/DL (ref 8.5–10.1)
CHLORIDE SERPL-SCNC: 110 MMOL/L (ref 97–108)
CHOLEST SERPL-MCNC: 179 MG/DL
CO2 SERPL-SCNC: 24 MMOL/L (ref 21–32)
CREAT SERPL-MCNC: 1.73 MG/DL (ref 0.7–1.3)
DIFFERENTIAL METHOD BLD: ABNORMAL
EOSINOPHIL # BLD: 0.1 K/UL (ref 0–0.4)
EOSINOPHIL NFR BLD: 2 % (ref 0–7)
ERYTHROCYTE [DISTWIDTH] IN BLOOD BY AUTOMATED COUNT: 17.4 % (ref 11.5–14.5)
FERRITIN SERPL-MCNC: 6 NG/ML (ref 26–388)
GLOBULIN SER CALC-MCNC: 3.8 G/DL (ref 2–4)
GLUCOSE SERPL-MCNC: 101 MG/DL (ref 65–100)
HCT VFR BLD AUTO: 33.2 % (ref 36.6–50.3)
HDLC SERPL-MCNC: 61 MG/DL
HDLC SERPL: 2.9 {RATIO} (ref 0–5)
HGB BLD-MCNC: 9 G/DL (ref 12.1–17)
IMM GRANULOCYTES # BLD AUTO: 0 K/UL (ref 0–0.04)
IMM GRANULOCYTES NFR BLD AUTO: 0 % (ref 0–0.5)
IRON SATN MFR SERPL: 3 % (ref 20–50)
IRON SERPL-MCNC: 13 UG/DL (ref 35–150)
LDLC SERPL CALC-MCNC: 96.8 MG/DL (ref 0–100)
LIPID PROFILE,FLP: NORMAL
LYMPHOCYTES # BLD: 1.3 K/UL (ref 0.8–3.5)
LYMPHOCYTES NFR BLD: 21 % (ref 12–49)
MCH RBC QN AUTO: 22 PG (ref 26–34)
MCHC RBC AUTO-ENTMCNC: 27.1 G/DL (ref 30–36.5)
MCV RBC AUTO: 81.2 FL (ref 80–99)
MONOCYTES # BLD: 0.6 K/UL (ref 0–1)
MONOCYTES NFR BLD: 9 % (ref 5–13)
NEUTS SEG # BLD: 4.3 K/UL (ref 1.8–8)
NEUTS SEG NFR BLD: 67 % (ref 32–75)
NRBC # BLD: 0 K/UL (ref 0–0.01)
NRBC BLD-RTO: 0 PER 100 WBC
PLATELET # BLD AUTO: 359 K/UL (ref 150–400)
PLATELET COMMENTS,PCOM: ABNORMAL
PMV BLD AUTO: 10.4 FL (ref 8.9–12.9)
POTASSIUM SERPL-SCNC: 4.6 MMOL/L (ref 3.5–5.1)
PROT SERPL-MCNC: 7.4 G/DL (ref 6.4–8.2)
RBC # BLD AUTO: 4.09 M/UL (ref 4.1–5.7)
RBC MORPH BLD: ABNORMAL
SODIUM SERPL-SCNC: 139 MMOL/L (ref 136–145)
TIBC SERPL-MCNC: 408 UG/DL (ref 250–450)
TRIGL SERPL-MCNC: 106 MG/DL (ref ?–150)
VLDLC SERPL CALC-MCNC: 21.2 MG/DL
WBC # BLD AUTO: 6.4 K/UL (ref 4.1–11.1)

## 2020-08-06 PROCEDURE — G8427 DOCREV CUR MEDS BY ELIG CLIN: HCPCS | Performed by: INTERNAL MEDICINE

## 2020-08-06 PROCEDURE — G0439 PPPS, SUBSEQ VISIT: HCPCS | Performed by: INTERNAL MEDICINE

## 2020-08-06 PROCEDURE — G8752 SYS BP LESS 140: HCPCS | Performed by: INTERNAL MEDICINE

## 2020-08-06 PROCEDURE — 99214 OFFICE O/P EST MOD 30 MIN: CPT | Performed by: INTERNAL MEDICINE

## 2020-08-06 PROCEDURE — 1101F PT FALLS ASSESS-DOCD LE1/YR: CPT | Performed by: INTERNAL MEDICINE

## 2020-08-06 PROCEDURE — G8510 SCR DEP NEG, NO PLAN REQD: HCPCS | Performed by: INTERNAL MEDICINE

## 2020-08-06 PROCEDURE — G8536 NO DOC ELDER MAL SCRN: HCPCS | Performed by: INTERNAL MEDICINE

## 2020-08-06 PROCEDURE — G8754 DIAS BP LESS 90: HCPCS | Performed by: INTERNAL MEDICINE

## 2020-08-06 PROCEDURE — G8417 CALC BMI ABV UP PARAM F/U: HCPCS | Performed by: INTERNAL MEDICINE

## 2020-08-06 NOTE — PROGRESS NOTES
This is a Subsequent Medicare Annual Wellness Visit providing Personalized Prevention Plan Services (PPPS) (Performed 12 months after initial AWV and PPPS )    I have reviewed the patient's medical history in detail and updated the computerized patient record. He is with his wife who \"made me come in\"    Reports increased dizziness. Occurs when standing. Onset 3-4 weeks ago. Has felt like he will pass out on a few occasions. Added amlodipine last year for BP and stopped hctz due to renal function. Hx of iron def anemia. Work-up 2014 with EGD, colonoscopy neg. Saw oncology Dr. Zaid Reese and was started on iron infusions. He is not taking iron now. Was on xarelto then. Now is taking asa. No noted dark or blood stools.    hgg 13.2 7/2019    Hypertension  Hypertension ROS: taking medications as instructed, no medication side effects noted, no TIA's, no chest pain on exertion, no dyspnea on exertion, no swelling of ankles     reports that he quit smoking about 32 years ago. He has a 5.00 pack-year smoking history. He has never used smokeless tobacco.    reports current alcohol use of about 3.0 standard drinks of alcohol per week. BP Readings from Last 2 Encounters:   08/06/20 108/72   07/02/19 148/75       History     Past Medical History:   Diagnosis Date    Arthritis     Benign essential HTN     took medication    Clear cell renal cell carcinoma, left ([facility] [address] 10/03/2018    L nephrectomy. Dr. Vania Andrew. 6.2 cm, neg nodes, unifocal    GERD (gastroesophageal reflux disease)     occasional r/t food intake    Hernia     Iron deficiency anemia     Dr. Macarena Carl. 2014. on Xarelto. did iron infusions.  Pulmonary emboli [facility]) 2014    Dr. Zaid Reese.   Xarelto caused anemia    White coat syndrome with hypertension 2/15/2018       Past Surgical History:   Procedure Laterality Date    HX COLONOSCOPY  2014    no source of bleeding    HX ENDOSCOPY  2014    no sournce of bleeding    HX NEPHRECTOMY Left 09/20/2018    renal cell carcinoma. Dr. Grimm Car      trigger fingers both pinky       Current Outpatient Medications   Medication Sig    lisinopril (PRINIVIL, ZESTRIL) 20 mg tablet TAKE 1 TABLET BY MOUTH EVERY DAY    omeprazole (PRILOSEC) 20 mg capsule Take 20 mg by mouth daily.  ASPIRIN (ASPIR-81 PO) Take  by mouth daily. No current facility-administered medications for this visit. Allergies   Allergen Reactions    Influenza Virus Vaccine, Specific Other (comments)     \"made him sick\"       Family History   Problem Relation Age of Onset    Stroke Sister     Cancer Brother         growth on kidney    No Known Problems Mother     No Known Problems Father     No Known Problems Sister         reports that he quit smoking about 32 years ago. He has a 5.00 pack-year smoking history. He has never used smokeless tobacco.   reports current alcohol use of about 3.0 standard drinks of alcohol per week. Depression Risk Factor Screening:       Alcohol Risk Factor Screening: On any occasion during the past 3 months, have you had more than 3 drinks containing alcohol? No    Do you average more than 14 drinks per week? No      Functional Ability and Level of Safety:     Hearing Loss   mild    Activities of Daily Living   Self-care. Requires assistance with: no ADLs    Fall Risk     Fall Risk Assessment, last 12 mths 8/6/2020   Able to walk? Yes   Fall in past 12 months? No         Abuse Screen   Patient is not abused    Review of Systems   A comprehensive review of systems was negative except for that written in the HPI. Physical Examination     Evaluation of Cognitive Function:  Mood/affect:  neutral, happy  Appearance: age appropriate  Family member/caregiver input: none    Blood pressure 108/72, pulse 92, temperature 98 °F (36.7 °C), temperature source Oral, resp. rate 14, height 6' 4\" (1.93 m), weight 259 lb 3.2 oz (117.6 kg), SpO2 95 %.   General appearance: alert, cooperative, no distress, appears stated age  Neck: supple, symmetrical, trachea midline, no adenopathy, thyroid: not enlarged, symmetric, no tenderness/mass/nodules, no carotid bruit and no JVD  Lungs: clear to auscultation bilaterally  Heart: regular rate and rhythm, S1, S2 normal, no murmur, click, rub or gallop  Extremities: extremities normal, atraumatic, no cyanosis or edema    Patient Care Team:  David Crawley MD as PCP - General (Internal Medicine)  David Crawley MD as PCP - [facility] Empaneled Provider      Advice/Referrals/Counseling   Education and counseling provided. See below for specific orders    Assessment/Plan   Diagnoses and all orders for this visit:    1. Medicare annual wellness visit, subsequent  He declined immunizations. 2. Orthostatic dizziness - BP is over-controlled. R/o anemia. No other s/s to suggest arrhythmia and no murmurs. Well-hydrated. -     CBC WITH AUTOMATED DIFF; Future    3. Benign essential HTN  Over-controlled. Check for anemia. Stop amlodipine. Cont lisinopril.    -     METABOLIC PANEL, COMPREHENSIVE; Future  -     LIPID PANEL; Future    4. Iron deficiency anemia, unspecified iron deficiency anemia type  Past hx. Unclear etiology, but was on NOAC at the time. Not taking iron. No clear reported s/s of bleeding. May need to repeat GI w/u if anemic and refer back for iron  -     CBC WITH AUTOMATED DIFF; Future  -     FERRITIN; Future  -     IRON PROFILE; Future    5. Hx of unilateral nephrectomy      Potential medication side effects were discussed with the patient; let me know if any occur.   Return for yearly Annual Wellness Visits

## 2020-08-10 DIAGNOSIS — D50.9 IRON DEFICIENCY ANEMIA, UNSPECIFIED IRON DEFICIENCY ANEMIA TYPE: Primary | ICD-10-CM

## 2020-08-10 NOTE — PROGRESS NOTES
Please let him know that he is ANEMIC again and has low iron levels. This is contributing to his lower blood pressure and dizziness. I recommend that he do a FIT test to look for blood in the stool again. I ordered it. Please have him  kit here. I also sent in a iron pill for him to take. I will either refer him back to GI for endoscopy or back to Hematology Dr. Allyssa Kelsey for iron infusion.   Repeat iron and cbc 1 month, SOONER IF very dizzy or worsening

## 2020-09-14 DIAGNOSIS — I10 BENIGN ESSENTIAL HTN: ICD-10-CM

## 2020-09-14 RX ORDER — LISINOPRIL 20 MG/1
TABLET ORAL
Qty: 90 TAB | Refills: 1 | Status: SHIPPED | OUTPATIENT
Start: 2020-09-14 | End: 2021-03-04 | Stop reason: SDUPTHER

## 2021-01-12 ENCOUNTER — OFFICE VISIT (OUTPATIENT)
Dept: INTERNAL MEDICINE CLINIC | Age: 86
End: 2021-01-12
Payer: MEDICARE

## 2021-01-12 VITALS
TEMPERATURE: 98 F | DIASTOLIC BLOOD PRESSURE: 60 MMHG | SYSTOLIC BLOOD PRESSURE: 150 MMHG | RESPIRATION RATE: 18 BRPM | HEART RATE: 67 BPM | HEIGHT: 76 IN | WEIGHT: 260 LBS | OXYGEN SATURATION: 98 % | BODY MASS INDEX: 31.66 KG/M2

## 2021-01-12 DIAGNOSIS — G62.9 NEUROPATHY: Primary | ICD-10-CM

## 2021-01-12 DIAGNOSIS — I10 BENIGN ESSENTIAL HTN: ICD-10-CM

## 2021-01-12 PROCEDURE — G8753 SYS BP > OR = 140: HCPCS | Performed by: INTERNAL MEDICINE

## 2021-01-12 PROCEDURE — G8417 CALC BMI ABV UP PARAM F/U: HCPCS | Performed by: INTERNAL MEDICINE

## 2021-01-12 PROCEDURE — G0463 HOSPITAL OUTPT CLINIC VISIT: HCPCS | Performed by: INTERNAL MEDICINE

## 2021-01-12 PROCEDURE — G8536 NO DOC ELDER MAL SCRN: HCPCS | Performed by: INTERNAL MEDICINE

## 2021-01-12 PROCEDURE — G8432 DEP SCR NOT DOC, RNG: HCPCS | Performed by: INTERNAL MEDICINE

## 2021-01-12 PROCEDURE — G8427 DOCREV CUR MEDS BY ELIG CLIN: HCPCS | Performed by: INTERNAL MEDICINE

## 2021-01-12 PROCEDURE — 1101F PT FALLS ASSESS-DOCD LE1/YR: CPT | Performed by: INTERNAL MEDICINE

## 2021-01-12 PROCEDURE — G8754 DIAS BP LESS 90: HCPCS | Performed by: INTERNAL MEDICINE

## 2021-01-12 PROCEDURE — 99213 OFFICE O/P EST LOW 20 MIN: CPT | Performed by: INTERNAL MEDICINE

## 2021-01-12 NOTE — PROGRESS NOTES
Assessment and Plan   Diagnoses and all orders for this visit:    1. Neuropathy  Reports difficulty walking when he first starts walking. Says that he feels a pins-and-needles sensation that will go away after several steps. Symptoms are worse when he has been sitting for a long time. He makes frequent trips to [place] and has noticed the symptoms when getting out of the car. Denies any pain when walking once his initial symptoms resolved after his first few steps. Denies any back pain, falls, swelling. Symptoms have been worse in the past month. Reports he has not been as active recently because he is needing to take care of his wife. Likely related to poor circulation after sitting for long time. Recommend stopping more frequently and walking around to aid with circulation. Advised to monitor symptoms and to let us know if he develops any pain even with walking and it does not go away. Elevated blood pressure reading  Currently taking lisinopril. Elevated today. Reports that his blood pressure is \"normal\" at home but is unable to give me a average number. Advised to monitor at home and let us know if elevated. Benefits, risks, possible drug interactions, and side effects of all new medications were reviewed with the patient. Pt verbalized understanding. Return to clinic: As needed    An electronic signature was used to authenticate this note. Anabela Ramirez MD  [facility]  1/12/2021    No future appointments. Subjective   Chief Complaint   Leg pain    Kori Cage is a 82 y.o. male           Objective   Vitals:       Visit Vitals  BP (!) 150/60   Pulse 67   Temp 98 °F (36.7 °C) (Oral)   Resp 18   Ht 6' 4\" (1.93 m)   Wt 260 lb (117.9 kg)   SpO2 98%   BMI 31.65 kg/m²        Physical Exam  Constitutional:       Appearance: Normal appearance. He is not ill-appearing. Cardiovascular:      Rate and Rhythm: Normal rate and regular rhythm. Heart sounds: No murmur. No friction rub. No gallop. Pulmonary:      Effort: No respiratory distress. Breath sounds: Normal breath sounds. No wheezing, rhonchi or rales. Musculoskeletal:      Comments: Feet pedal pulses left foot, unable to palpate pedal pulses on right foot but feet are warm and normal cap refill. Normal range of motion   Neurological:      Mental Status: He is alert. Gait: Gait normal.          Current Outpatient Medications   Medication Sig    lisinopriL (PRINIVIL, ZESTRIL) 20 mg tablet TAKE 1 TABLET BY MOUTH EVERY DAY    omeprazole (PRILOSEC) 20 mg capsule Take 20 mg by mouth daily.  ASPIRIN (ASPIR-81 PO) Take  by mouth daily. No current facility-administered medications for this visit.

## 2021-03-04 DIAGNOSIS — I10 BENIGN ESSENTIAL HTN: ICD-10-CM

## 2021-03-04 RX ORDER — LISINOPRIL 20 MG/1
TABLET ORAL
Qty: 90 TAB | Refills: 1 | Status: SHIPPED | OUTPATIENT
Start: 2021-03-04 | End: 2021-09-04

## 2021-05-27 ENCOUNTER — OFFICE VISIT (OUTPATIENT)
Dept: INTERNAL MEDICINE CLINIC | Age: 86
End: 2021-05-27
Payer: MEDICARE

## 2021-05-27 VITALS
HEIGHT: 76 IN | DIASTOLIC BLOOD PRESSURE: 89 MMHG | RESPIRATION RATE: 12 BRPM | OXYGEN SATURATION: 98 % | SYSTOLIC BLOOD PRESSURE: 155 MMHG | HEART RATE: 71 BPM | BODY MASS INDEX: 31.68 KG/M2 | TEMPERATURE: 98 F | WEIGHT: 260.2 LBS

## 2021-05-27 DIAGNOSIS — S01.81XA LACERATION OF EYEBROW AND FOREHEAD, RIGHT, INITIAL ENCOUNTER: ICD-10-CM

## 2021-05-27 DIAGNOSIS — I10 WHITE COAT SYNDROME WITH HYPERTENSION: ICD-10-CM

## 2021-05-27 DIAGNOSIS — Z12.11 ENCOUNTER FOR SCREENING FECAL OCCULT BLOOD TESTING: ICD-10-CM

## 2021-05-27 DIAGNOSIS — N18.30 STAGE 3 CHRONIC KIDNEY DISEASE, UNSPECIFIED WHETHER STAGE 3A OR 3B CKD (HCC): ICD-10-CM

## 2021-05-27 DIAGNOSIS — D50.9 IRON DEFICIENCY ANEMIA, UNSPECIFIED IRON DEFICIENCY ANEMIA TYPE: ICD-10-CM

## 2021-05-27 DIAGNOSIS — R42 ORTHOSTATIC DIZZINESS: ICD-10-CM

## 2021-05-27 DIAGNOSIS — S60.211A CONTUSION OF RIGHT WRIST, INITIAL ENCOUNTER: ICD-10-CM

## 2021-05-27 DIAGNOSIS — R55 SYNCOPE, UNSPECIFIED SYNCOPE TYPE: Primary | ICD-10-CM

## 2021-05-27 DIAGNOSIS — I49.8 SINUS ARRHYTHMIA: ICD-10-CM

## 2021-05-27 DIAGNOSIS — S01.111A LACERATION OF EYEBROW AND FOREHEAD, RIGHT, INITIAL ENCOUNTER: ICD-10-CM

## 2021-05-27 DIAGNOSIS — C64.2 CLEAR CELL RENAL CELL CARCINOMA, LEFT (HCC): ICD-10-CM

## 2021-05-27 DIAGNOSIS — I10 BENIGN ESSENTIAL HTN: ICD-10-CM

## 2021-05-27 DIAGNOSIS — F51.01 PRIMARY INSOMNIA: ICD-10-CM

## 2021-05-27 DIAGNOSIS — S80.212A ABRASION OF LEFT KNEE, INITIAL ENCOUNTER: ICD-10-CM

## 2021-05-27 DIAGNOSIS — I49.3 PVC (PREMATURE VENTRICULAR CONTRACTION): ICD-10-CM

## 2021-05-27 LAB
ALBUMIN SERPL-MCNC: 3.5 G/DL (ref 3.5–5)
ALBUMIN/GLOB SERPL: 0.9 {RATIO} (ref 1.1–2.2)
ALP SERPL-CCNC: 124 U/L (ref 45–117)
ALT SERPL-CCNC: 16 U/L (ref 12–78)
ANION GAP SERPL CALC-SCNC: 7 MMOL/L (ref 5–15)
AST SERPL-CCNC: 15 U/L (ref 15–37)
BASOPHILS # BLD: 0.1 K/UL (ref 0–0.1)
BASOPHILS NFR BLD: 1 % (ref 0–1)
BILIRUB SERPL-MCNC: 0.6 MG/DL (ref 0.2–1)
BUN SERPL-MCNC: 20 MG/DL (ref 6–20)
BUN/CREAT SERPL: 13 (ref 12–20)
CALCIUM SERPL-MCNC: 9.1 MG/DL (ref 8.5–10.1)
CHLORIDE SERPL-SCNC: 108 MMOL/L (ref 97–108)
CO2 SERPL-SCNC: 25 MMOL/L (ref 21–32)
CREAT SERPL-MCNC: 1.5 MG/DL (ref 0.7–1.3)
DIFFERENTIAL METHOD BLD: ABNORMAL
EOSINOPHIL # BLD: 0.2 K/UL (ref 0–0.4)
EOSINOPHIL NFR BLD: 3 % (ref 0–7)
ERYTHROCYTE [DISTWIDTH] IN BLOOD BY AUTOMATED COUNT: 18.8 % (ref 11.5–14.5)
FERRITIN SERPL-MCNC: 10 NG/ML (ref 26–388)
GLOBULIN SER CALC-MCNC: 3.9 G/DL (ref 2–4)
GLUCOSE SERPL-MCNC: 90 MG/DL (ref 65–100)
HCT VFR BLD AUTO: 29.3 % (ref 36.6–50.3)
HGB BLD-MCNC: 7.8 G/DL (ref 12.1–17)
IMM GRANULOCYTES # BLD AUTO: 0.1 K/UL (ref 0–0.04)
IMM GRANULOCYTES NFR BLD AUTO: 1 % (ref 0–0.5)
IRON SATN MFR SERPL: 4 % (ref 20–50)
IRON SERPL-MCNC: 16 UG/DL (ref 35–150)
LYMPHOCYTES # BLD: 1 K/UL (ref 0.8–3.5)
LYMPHOCYTES NFR BLD: 12 % (ref 12–49)
MCH RBC QN AUTO: 21.3 PG (ref 26–34)
MCHC RBC AUTO-ENTMCNC: 26.6 G/DL (ref 30–36.5)
MCV RBC AUTO: 79.8 FL (ref 80–99)
MONOCYTES # BLD: 0.8 K/UL (ref 0–1)
MONOCYTES NFR BLD: 10 % (ref 5–13)
NEUTS SEG # BLD: 5.8 K/UL (ref 1.8–8)
NEUTS SEG NFR BLD: 73 % (ref 32–75)
NRBC # BLD: 0.02 K/UL (ref 0–0.01)
NRBC BLD-RTO: 0.3 PER 100 WBC
PLATELET # BLD AUTO: 311 K/UL (ref 150–400)
PLATELET COMMENTS,PCOM: ABNORMAL
PMV BLD AUTO: 10.8 FL (ref 8.9–12.9)
POTASSIUM SERPL-SCNC: 4.6 MMOL/L (ref 3.5–5.1)
PROT SERPL-MCNC: 7.4 G/DL (ref 6.4–8.2)
RBC # BLD AUTO: 3.67 M/UL (ref 4.1–5.7)
RBC MORPH BLD: ABNORMAL
SODIUM SERPL-SCNC: 140 MMOL/L (ref 136–145)
TIBC SERPL-MCNC: 417 UG/DL (ref 250–450)
TSH SERPL DL<=0.05 MIU/L-ACNC: 2.08 UIU/ML (ref 0.36–3.74)
WBC # BLD AUTO: 8 K/UL (ref 4.1–11.1)

## 2021-05-27 PROCEDURE — G8754 DIAS BP LESS 90: HCPCS | Performed by: INTERNAL MEDICINE

## 2021-05-27 PROCEDURE — 99214 OFFICE O/P EST MOD 30 MIN: CPT | Performed by: INTERNAL MEDICINE

## 2021-05-27 PROCEDURE — G8417 CALC BMI ABV UP PARAM F/U: HCPCS | Performed by: INTERNAL MEDICINE

## 2021-05-27 PROCEDURE — 1101F PT FALLS ASSESS-DOCD LE1/YR: CPT | Performed by: INTERNAL MEDICINE

## 2021-05-27 PROCEDURE — G0463 HOSPITAL OUTPT CLINIC VISIT: HCPCS | Performed by: INTERNAL MEDICINE

## 2021-05-27 PROCEDURE — G8753 SYS BP > OR = 140: HCPCS | Performed by: INTERNAL MEDICINE

## 2021-05-27 PROCEDURE — G8536 NO DOC ELDER MAL SCRN: HCPCS | Performed by: INTERNAL MEDICINE

## 2021-05-27 PROCEDURE — G8427 DOCREV CUR MEDS BY ELIG CLIN: HCPCS | Performed by: INTERNAL MEDICINE

## 2021-05-27 PROCEDURE — G8510 SCR DEP NEG, NO PLAN REQD: HCPCS | Performed by: INTERNAL MEDICINE

## 2021-05-27 RX ORDER — TRAZODONE HYDROCHLORIDE 50 MG/1
50 TABLET ORAL
Qty: 30 TABLET | Refills: 2 | Status: SHIPPED | OUTPATIENT
Start: 2021-05-27 | End: 2021-07-01 | Stop reason: SDUPTHER

## 2021-05-31 NOTE — PROGRESS NOTES
HISTORY OF PRESENT ILLNESS    Chief Complaint   Patient presents with    Fatigue    Other     balance issues    Fall     Seen by ambulance only - 2 days ago. Presents for follow-up    Presents with his wife. He reports a ground-level fall 2 days ago. Ambulance came to evaluate. Vital signs were stable and patient was doing okay, so no hospital transportation was done. He suffered a laceration of his right forehead, right wrist contusion and abrasion of his right knee. He does have a history of possible orthostatic hypotension and also history of hypertension. He is currently taking lisinopril 20 mg daily. History of iron deficiency anemia. Has previously been followed by Dr. Lauren Aleman. History of iron infusions. He did have significant anemia while taking Xarelto. He had upper endoscopy and colonoscopy in 2014 with no obvious source of bleeding. He is not currently taking an iron supplement. Last hemoglobin was 9.0 August 2020. I ordered occult blood occult testing but patient did not do this. He was also recommended to follow-up within 1 month for repeat CBC but did not. Review of Systems   All other systems reviewed and are negative, except as noted in HPI    Past Medical and Surgical History   has a past medical history of Arthritis, Benign essential HTN, Clear cell renal cell carcinoma, left ([facility] [address] (10/03/2018), GERD (gastroesophageal reflux disease), Hernia, Iron deficiency anemia, Pulmonary emboli ([address] (2014), PVC's (premature ventricular contractions), and White coat syndrome with hypertension (2/15/2018). has a past surgical history that includes hx orthopaedic; hx colonoscopy (2014); hx endoscopy (2014); and hx nephrectomy (Left, 09/20/2018). reports that he quit smoking about 33 years ago. He has a 5.00 pack-year smoking history. He has never used smokeless tobacco. He reports current alcohol use of about 3.0 standard drinks of alcohol per week.  He reports that he does not use drugs. family history includes Cancer in his brother; No Known Problems in his father, mother, and sister; Stroke in his sister. Physical Exam   Nursing note and vitals reviewed. Blood pressure (!) 155/89, pulse 71, temperature 98 °F (36.7 °C), temperature source Oral, resp. rate 12, height 6' 4\" (1.93 m), weight 260 lb 3.2 oz (118 kg), SpO2 98 %. Constitutional:  No distress. Eyes: Conjunctivae are normal.   Right forehead with 3 cm abrasion with 3 mm gap. Ears:  Hearing grossly intact  Cardiovascular: Normal rate. regular rhythm, no murmurs or gallops  No edema  Pulmonary/Chest: Effort normal.   CTAB  Musculoskeletal: moves all 4 extremities   right knee with obvious abrasion. No bleeding. Neurological: Alert and oriented to person, place, and time. Skin: No visible rash noted. Psychiatric: Normal mood and affect. Behavior is normal.     ASSESSMENT and PLAN  Diagnoses and all orders for this visit:    1. Syncope, unspecified syncope type  2. Orthostatic dizziness  Significant orthostatic dizziness. Evaluate for anemia. Consider cardiac etiology. His wife would like a referral to Dr. Rakan Nieto whom she sees. -     REFERRAL TO CARDIOLOGY    3. Sinus arrhythmia  4. PVC (premature ventricular contraction)  Past history. Unclear if related. -     REFERRAL TO CARDIOLOGY    5. Benign essential HTN  Blood pressure is elevated in the office, but does have whitecoat effects but also is experiencing orthostatic symptoms. Consider adjusting lisinopril. Continue to monitor blood pressure at home. Has been much better there. -     METABOLIC PANEL, COMPREHENSIVE; Future  -     REFERRAL TO CARDIOLOGY    6. White coat syndrome with hypertension    7. Stage 3 chronic kidney disease, unspecified whether stage 3a or 3b CKD ([address]  Unclear current status. Repeat renal function. 8. Iron deficiency anemia, unspecified iron deficiency anemia type  Past history of iron deficiency anemia.   May need repeat iron infusions. Check FIT testing. Consider repeat upper and lower endoscopy and possibly bleeding scan if persists. -     CBC WITH AUTOMATED DIFF; Future  -     IRON PROFILE; Future  -     FERRITIN; Future  -     TSH 3RD GENERATION; Future    9. Clear cell renal cell carcinoma, left [facility])  Status post nephrectomy September 2018    10. Primary insomnia  Uncontrolled insomnia. Will recommend trazodone. Side effects discussed. -     traZODone (DESYREL) 50 mg tablet; Take 1 Tablet by mouth nightly. For insomnia    11. Laceration of eyebrow and forehead, right, initial encounter  2 cm abrasion above right eyebrow. This ideally would have been sutured but it is too late at this point. Local wound care. 12. Contusion of right wrist, initial encounter  Minor. Local wound care and ice. 13. Abrasion of left knee, initial encounter  Minor. Local wound care and ice. 15. Encounter for screening fecal occult blood testing  -     OCCULT BLOOD IMMUNOASSAY,DIAGNOSTIC; Future      lab results and schedule of future lab studies reviewed with patient  reviewed medications and side effects in detail    Return to clinic for further evaluation if new symptoms develop        Current Outpatient Medications   Medication Sig    traZODone (DESYREL) 50 mg tablet Take 1 Tablet by mouth nightly. For insomnia    lisinopriL (PRINIVIL, ZESTRIL) 20 mg tablet Take one tablet by mouth once daily    omeprazole (PRILOSEC) 20 mg capsule Take 20 mg by mouth daily.  ASPIRIN (ASPIR-81 PO) Take  by mouth daily. No current facility-administered medications for this visit.

## 2021-06-03 ENCOUNTER — HOSPITAL ENCOUNTER (OUTPATIENT)
Dept: LAB | Age: 86
Discharge: HOME | End: 2021-06-03
Payer: MEDICARE

## 2021-06-03 PROCEDURE — 82270 OCCULT BLOOD FECES: CPT

## 2021-06-04 LAB
HEMOCCULT STL QL IA: POSITIVE
SPECIMEN STATUS REPORT, ROLRST: NORMAL

## 2021-06-09 DIAGNOSIS — R19.5 POSITIVE FIT (FECAL IMMUNOCHEMICAL TEST): ICD-10-CM

## 2021-06-09 DIAGNOSIS — Z86.010 HISTORY OF COLON POLYPS: ICD-10-CM

## 2021-06-09 DIAGNOSIS — D50.8 OTHER IRON DEFICIENCY ANEMIA: Primary | ICD-10-CM

## 2021-06-09 RX ORDER — FERROUS SULFATE 325(65) MG
325 TABLET, DELAYED RELEASE (ENTERIC COATED) ORAL
Qty: 30 TABLET | Refills: 5 | Status: SHIPPED | OUTPATIENT
Start: 2021-06-09 | End: 2021-06-30

## 2021-06-10 ENCOUNTER — TELEPHONE (OUTPATIENT)
Dept: INTERNAL MEDICINE CLINIC | Age: 86
End: 2021-06-10

## 2021-06-10 NOTE — TELEPHONE ENCOUNTER
I spoke with pts wife regarding test results and message from pcp. All questions answer. I provided wife contact numbers to both specialist. Pt has an appt with cardiology dr. Luisana Canchola on 6/24 @ 130pm. Wife states pt had another episode outside Sunday where he fell, wife states pt had a few drinks as well. No LOC and injury. Neighbor picked pt up. He wasn't evaluated at urgent care. I recommend DH or evaluation at urgent care regarding recent fall before the weekend. Wife declined. I advise if pt has any sx chest pain, headache, blurry vision, dizziness, vomiting then she needs to take him to ER. Wife verbalized understanding. Wife will call to schedule appts for pt. 1 month f/up appt made for pt to see pcp. I faxed over notes, labs and referral information to all 3 specialists.

## 2021-06-10 NOTE — PROGRESS NOTES
Please call patient to let him know that he has worsening anemia with a hemoglobin of 7.8. His anemia is due to iron deficiency. I sent in been ferrous sulfate pills once daily. Please also forward labs to his dermatologist, Dr. Weeks Smoker. Patient has required iron infusions in the past.  I would recommend repeat labs in 1 month to see if this is necessary. Either I or Dr. Desi Marrero can do this. In addition, he has blood in his stool. This is likely causing his iron deficiency anemia. I recommend consultation with gastroenterology for upper endoscopy and colonoscopy to evaluate for any sources of blood. Please refer. His previous GI doctor, Dr. Benigno Greene, has retired. Referrals entered in system.

## 2021-06-30 ENCOUNTER — OFFICE VISIT (OUTPATIENT)
Dept: INTERNAL MEDICINE CLINIC | Age: 86
End: 2021-06-30
Payer: MEDICARE

## 2021-06-30 VITALS
DIASTOLIC BLOOD PRESSURE: 77 MMHG | WEIGHT: 260 LBS | OXYGEN SATURATION: 100 % | SYSTOLIC BLOOD PRESSURE: 148 MMHG | TEMPERATURE: 98.2 F | RESPIRATION RATE: 14 BRPM | BODY MASS INDEX: 31.66 KG/M2 | HEIGHT: 76 IN | HEART RATE: 65 BPM

## 2021-06-30 DIAGNOSIS — I48.3 TYPICAL ATRIAL FLUTTER (HCC): Primary | ICD-10-CM

## 2021-06-30 DIAGNOSIS — Z09 HOSPITAL DISCHARGE FOLLOW-UP: ICD-10-CM

## 2021-06-30 DIAGNOSIS — I10 BENIGN ESSENTIAL HTN: ICD-10-CM

## 2021-06-30 DIAGNOSIS — I63.332 CEREBROVASCULAR ACCIDENT (CVA) DUE TO THROMBOSIS OF LEFT POSTERIOR CEREBRAL ARTERY (HCC): ICD-10-CM

## 2021-06-30 DIAGNOSIS — D50.9 IRON DEFICIENCY ANEMIA, UNSPECIFIED IRON DEFICIENCY ANEMIA TYPE: ICD-10-CM

## 2021-06-30 DIAGNOSIS — R19.5 POSITIVE FIT (FECAL IMMUNOCHEMICAL TEST): ICD-10-CM

## 2021-06-30 PROCEDURE — G8536 NO DOC ELDER MAL SCRN: HCPCS | Performed by: INTERNAL MEDICINE

## 2021-06-30 PROCEDURE — G8417 CALC BMI ABV UP PARAM F/U: HCPCS | Performed by: INTERNAL MEDICINE

## 2021-06-30 PROCEDURE — G8753 SYS BP > OR = 140: HCPCS | Performed by: INTERNAL MEDICINE

## 2021-06-30 PROCEDURE — G8427 DOCREV CUR MEDS BY ELIG CLIN: HCPCS | Performed by: INTERNAL MEDICINE

## 2021-06-30 PROCEDURE — 1111F DSCHRG MED/CURRENT MED MERGE: CPT | Performed by: INTERNAL MEDICINE

## 2021-06-30 PROCEDURE — 1101F PT FALLS ASSESS-DOCD LE1/YR: CPT | Performed by: INTERNAL MEDICINE

## 2021-06-30 PROCEDURE — G0463 HOSPITAL OUTPT CLINIC VISIT: HCPCS | Performed by: INTERNAL MEDICINE

## 2021-06-30 PROCEDURE — G8510 SCR DEP NEG, NO PLAN REQD: HCPCS | Performed by: INTERNAL MEDICINE

## 2021-06-30 PROCEDURE — 99215 OFFICE O/P EST HI 40 MIN: CPT | Performed by: INTERNAL MEDICINE

## 2021-06-30 PROCEDURE — G8754 DIAS BP LESS 90: HCPCS | Performed by: INTERNAL MEDICINE

## 2021-06-30 RX ORDER — AMLODIPINE BESYLATE 5 MG/1
5 TABLET ORAL DAILY
Qty: 30 TABLET | Refills: 5
Start: 2021-06-30 | End: 2021-07-20 | Stop reason: SDUPTHER

## 2021-06-30 RX ORDER — ATORVASTATIN CALCIUM 10 MG/1
10 TABLET, FILM COATED ORAL DAILY
Qty: 30 TABLET | Refills: 5
Start: 2021-06-30 | End: 2021-07-20 | Stop reason: SDUPTHER

## 2021-06-30 RX ORDER — ATORVASTATIN CALCIUM 10 MG/1
TABLET, FILM COATED ORAL
COMMUNITY
Start: 2021-06-15 | End: 2021-06-30 | Stop reason: SDUPTHER

## 2021-06-30 RX ORDER — AMLODIPINE BESYLATE 5 MG/1
TABLET ORAL
COMMUNITY
Start: 2021-06-15 | End: 2021-06-30 | Stop reason: SDUPTHER

## 2021-06-30 RX ORDER — FERROUS SULFATE 325(65) MG
325 TABLET, DELAYED RELEASE (ENTERIC COATED) ORAL 2 TIMES DAILY
Qty: 90 TABLET | Refills: 5
Start: 2021-06-30 | End: 2021-10-03

## 2021-06-30 RX ORDER — DOCUSATE SODIUM 100 MG/1
100 CAPSULE, LIQUID FILLED ORAL 2 TIMES DAILY
Qty: 60 CAPSULE | Refills: 2
Start: 2021-06-30 | End: 2021-09-28

## 2021-06-30 RX ORDER — APIXABAN 5 MG/1
TABLET, FILM COATED ORAL
COMMUNITY
Start: 2021-06-15 | End: 2021-06-30 | Stop reason: SDUPTHER

## 2021-06-30 RX ORDER — FERROUS SULFATE 325(65) MG
325 TABLET, DELAYED RELEASE (ENTERIC COATED) ORAL
Qty: 90 TABLET | Refills: 5
Start: 2021-06-30 | End: 2021-06-30

## 2021-06-30 RX ORDER — APIXABAN 5 MG/1
5 TABLET, FILM COATED ORAL EVERY 12 HOURS
Qty: 60 TABLET | Refills: 5
Start: 2021-06-30 | End: 2021-07-20 | Stop reason: SDUPTHER

## 2021-06-30 NOTE — PROGRESS NOTES
HISTORY OF PRESENT ILLNESS    Chief Complaint   Patient presents with   [address] [place] [address]     Just would like to update you        Presents for 1 month follow-up. He is accompanied by his wife. Admitted to [address] [place] [address] 6/12 - 6/15/21 for SOB and dizziness. Hx falls 5/25/21 and again 6/8/21. No LOC or injury. Was diagnosed with a acute infarction of his posterior left lentiform nucleus and corona radiata. This was deemed secondary to new onset of atrial flutter. Cardioembolic. Patient symptomatically improved relatively rapidly regarding dizziness with no other focal neurologic deficits. MRI did show remote ischemic insults in the right cerebellum as well. He was started on amlodipine for blood pressure and continued lisinopril. Cardiology was consulted regarding his atrial flutter and ANIRUDH was planned for cardioversion, but it was aborted secondary to cardiac thrombus. He was placed on anticoagulant therapy with Eliquis. Was given AV blockers in the hospital but not discharged on any. Given gentle diuresis for new onset diastolic heart failure with improvement of edema. Recommend consideration for outpatient sleep study. Labs in the hospital showed creatinine 1.62, sodium 139, hemoglobin 9.7    Labs 5/27/21 showed worsening anemia with a hemoglobin of 7.8. His anemia is due to iron deficiency. I sent in ferrous sulfate pills once daily. Hematologist, Dr. Arley Mckinnon. Patient has required iron infusions in the past and is scheduled for infusion 7/1 and 7/19, but patient says he does not want infusions and hospital doctor. Repeat cbc 6/23/21 Hgb 9.0 per Dr. Arley Mckinnon    FIT + 6/2/21. Referred to gastroenterology for upper endoscopy and colonoscopy to evaluate for any sources of blood. Wife says she is working with Dr. Lynsey Vazquez to make an appt. Saw cardiology Dr. Kym Alfredo (referred by hospital) 6/29/21 yesterday for Atrial Flutter.   Recommended PET stress test 7/21 and scheduled for ANIRUDH CV 8/5/21 with Dr. Delta Lepe. Today, he says he is feeling great. Says energy levels are much better, denies any shortness of breath, chest pain, dizziness. Walking and driving well. [facility] wife. They say there is a hospital with him 3 miles at where they are staying. Review of Systems   All other systems reviewed and are negative, except as noted in HPI    Past Medical and Surgical History   has a past medical history of Arthritis, Benign essential HTN, Clear cell renal cell carcinoma, left ([facility] [address] (10/03/2018), GERD (gastroesophageal reflux disease), Hernia, Iron deficiency anemia, Pulmonary emboli ([facility] [address] (2014), PVC's (premature ventricular contractions), and White coat syndrome with hypertension (2/15/2018). has a past surgical history that includes hx orthopaedic; hx colonoscopy (03/05/2015); hx endoscopy (2014); and hx nephrectomy (Left, 09/20/2018). reports that he quit smoking about 33 years ago. He has a 5.00 pack-year smoking history. He has never used smokeless tobacco. He reports current alcohol use of about 3.0 standard drinks of alcohol per week. He reports that he does not use drugs. family history includes Cancer in his brother; No Known Problems in his father, mother, and sister; Stroke in his sister. Physical Exam   Nursing note and vitals reviewed. Blood pressure (!) 148/77, pulse 65, temperature 98.2 °F (36.8 °C), temperature source Temporal, resp. rate 14, height 6' 4\" (1.93 m), weight 260 lb (117.9 kg), SpO2 100 %. Constitutional:  No distress. Eyes: Conjunctivae are normal.   Ears:  Hearing grossly intact  Cardiovascular: Normal rate. regular rhythm, no murmurs or gallops  No edema  Pulmonary/Chest: Effort normal.   CTAB  Musculoskeletal: moves all 4 extremities   Neurological: Alert and oriented to person, place, and time. Skin: No visible rash noted. Psychiatric: Normal mood and affect.  Behavior is normal. ASSESSMENT and PLAN  Diagnoses and all orders for this visit:    1. Typical atrial flutter [facility])  He has established care with Dr. Melani Topete and is also seeing Dr. Wanda Paez. Will have stress test in July and ANIRUDH cardioversion scheduled for August.  This will be 2+ months after being on blood thinner.  -     Eliquis 5 mg tablet; Take 1 Tablet by mouth every twelve (12) hours. Indications: treatment to prevent blood clots in chronic atrial fibrillation, a clot in the lung    2. Positive FIT (fecal immunochemical test)  Unclear etiology. He did have 2 tubular adenomas in March 2015. He should have another endoscopy and colonoscopy to evaluate for possible sources of bleeding but he has had this work-up done before in 2014 which did not show anything. His wife insists on contacting Dr. Carissa Nichols office to work on this. Patient is not very motivated to have any additional work-up done. Need to  Monitor very closely for any worsening symptoms of gastrointestinal bleeding including red stools, dizziness, acute fatigue. Dark stools may be present gives he is taking iron. See below. -     docusate sodium (COLACE) 100 mg capsule; Take 1 Capsule by mouth two (2) times a day for 90 days. Indications: constipation  -     REFERRAL TO GASTROENTEROLOGY    3. Iron deficiency anemia, unspecified iron deficiency anemia type   Significant iron deficiency. He is now taking iron twice daily. Slow GI bleeding as above. Risk of worsening GI bleeding because of being on anticoagulant therapy. I recommend that he have his iron infusions as scheduled starting tomorrow and in 2 weeks with Dr. Leticia Juares. Patient declines iron infusions at this time. Says he wants to reassess in 1 month. Will be driving to [place] for 2 weeks tomorrow. There is a hospital within 3 miles at where they are staying.  -     ferrous sulfate (IRON) 325 mg (65 mg iron) EC tablet; Take 1 Tablet by mouth two (2) times a day.   -     REFERRAL TO GASTROENTEROLOGY    4. 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 Commend continued Eliquis therapy  as tolerable  -     atorvastatin (LIPITOR) 10 mg tablet; Take 1 Tablet by mouth daily. Indications: excessive fat in the blood    5. Hospital discharge follow-up  -     AR DISCHARGE MEDS RECONCILED W/ CURRENT OUTPATIENT MED LIST    6. Benign essential HTN  Blood pressure is borderline controlled with recent addition of amlodipine. Some anxiety coming to the office is contributing as well. Continue to monitor.  -     amLODIPine (NORVASC) 5 mg tablet; Take 1 Tablet by mouth daily. Indications: high blood pressure      lab results and schedule of future lab studies reviewed with patient  reviewed medications and side effects in detail    Return to clinic for further evaluation if new symptoms develop        Current Outpatient Medications   Medication Sig    Eliquis 5 mg tablet Take 1 Tablet by mouth every twelve (12) hours. Indications: treatment to prevent blood clots in chronic atrial fibrillation, a clot in the lung    amLODIPine (NORVASC) 5 mg tablet Take 1 Tablet by mouth daily. Indications: high blood pressure    atorvastatin (LIPITOR) 10 mg tablet Take 1 Tablet by mouth daily. Indications: excessive fat in the blood    ferrous sulfate (IRON) 325 mg (65 mg iron) EC tablet Take 1 Tablet by mouth two (2) times a day.  docusate sodium (COLACE) 100 mg capsule Take 1 Capsule by mouth two (2) times a day for 90 days. Indications: constipation    traZODone (DESYREL) 50 mg tablet Take 1 Tablet by mouth nightly. For insomnia    lisinopriL (PRINIVIL, ZESTRIL) 20 mg tablet Take one tablet by mouth once daily    omeprazole (PRILOSEC) 20 mg capsule Take 20 mg by mouth daily. No current facility-administered medications for this visit.

## 2021-07-01 DIAGNOSIS — F51.01 PRIMARY INSOMNIA: ICD-10-CM

## 2021-07-01 RX ORDER — TRAZODONE HYDROCHLORIDE 50 MG/1
50 TABLET ORAL
Qty: 30 TABLET | Refills: 2 | Status: SHIPPED | OUTPATIENT
Start: 2021-07-01 | End: 2021-10-15

## 2021-07-01 NOTE — TELEPHONE ENCOUNTER
Last appt: 6/30/2021  No future appointments. Requested Prescriptions     Pending Prescriptions Disp Refills    traZODone (DESYREL) 50 mg tablet 30 Tablet 2     Sig: Take 1 Tablet by mouth nightly.  For insomnia     Would like 90 day supply

## 2021-07-20 ENCOUNTER — TELEPHONE (OUTPATIENT)
Dept: INTERNAL MEDICINE CLINIC | Age: 86
End: 2021-07-20

## 2021-07-20 DIAGNOSIS — I10 BENIGN ESSENTIAL HTN: ICD-10-CM

## 2021-07-20 DIAGNOSIS — I48.3 TYPICAL ATRIAL FLUTTER (HCC): ICD-10-CM

## 2021-07-20 DIAGNOSIS — I63.332 CEREBROVASCULAR ACCIDENT (CVA) DUE TO THROMBOSIS OF LEFT POSTERIOR CEREBRAL ARTERY (HCC): ICD-10-CM

## 2021-07-20 RX ORDER — AMLODIPINE BESYLATE 5 MG/1
5 TABLET ORAL DAILY
Qty: 30 TABLET | Refills: 5 | Status: SHIPPED | OUTPATIENT
Start: 2021-07-20 | End: 2022-01-11

## 2021-07-20 RX ORDER — ATORVASTATIN CALCIUM 10 MG/1
10 TABLET, FILM COATED ORAL DAILY
Qty: 30 TABLET | Refills: 5 | Status: SHIPPED | OUTPATIENT
Start: 2021-07-20 | End: 2022-01-11

## 2021-07-20 RX ORDER — APIXABAN 5 MG/1
5 TABLET, FILM COATED ORAL EVERY 12 HOURS
Qty: 60 TABLET | Refills: 5 | Status: SHIPPED | OUTPATIENT
Start: 2021-07-20 | End: 2022-01-20 | Stop reason: SDUPTHER

## 2021-07-20 NOTE — TELEPHONE ENCOUNTER
----- Message from [place] Petit sent at 7/20/2021 10:46 AM EDT -----  Regarding: /telephone  Contact: [phone]  General Message/Vendor Calls    Caller's first and last name: Todd Harper wife      Reason for call: He is completely out of \"Atorvastatin\", \"Amlodipine\", \"Eliquis\" that he was given from the hospital, she is unsure if he needs to continue the medication and they were not given any further instructions.        Callback required yes/no and why: Yes with further instruction      Best contact number(s): [phone] or [phone]      Details to clarify the request: N/A      Dani Petit

## 2021-07-20 NOTE — TELEPHONE ENCOUNTER
Return call made to patient -- advised refill was sent to provider to review. Patient was thankful for the call back.

## 2021-08-23 ENCOUNTER — TELEPHONE (OUTPATIENT)
Dept: INTERNAL MEDICINE CLINIC | Age: 86
End: 2021-08-23

## 2021-08-23 NOTE — TELEPHONE ENCOUNTER
Pts wife called the back line, stating  has lower abdominal pain. Wife wants an appt with pcp only. I advise that pcp was out of the office all last week and I schedule her an appt with another provider. Wife declined. I offered DH, wife declined. I advise to let us know if things get worse.

## 2021-09-04 DIAGNOSIS — I10 BENIGN ESSENTIAL HTN: ICD-10-CM

## 2021-09-04 RX ORDER — LISINOPRIL 20 MG/1
TABLET ORAL
Qty: 90 TABLET | Refills: 1 | Status: SHIPPED | OUTPATIENT
Start: 2021-09-04 | End: 2022-03-08

## 2021-10-03 DIAGNOSIS — D50.9 IRON DEFICIENCY ANEMIA, UNSPECIFIED IRON DEFICIENCY ANEMIA TYPE: ICD-10-CM

## 2021-10-03 RX ORDER — FERROUS SULFATE 325(65) MG
TABLET, DELAYED RELEASE (ENTERIC COATED) ORAL
Qty: 90 TABLET | Refills: 1 | Status: SHIPPED | OUTPATIENT
Start: 2021-10-03

## 2021-10-15 DIAGNOSIS — F51.01 PRIMARY INSOMNIA: ICD-10-CM

## 2021-10-15 RX ORDER — TRAZODONE HYDROCHLORIDE 50 MG/1
50 TABLET ORAL
Qty: 30 TABLET | Refills: 2 | Status: SHIPPED | OUTPATIENT
Start: 2021-10-15 | End: 2022-01-24

## 2022-01-11 DIAGNOSIS — I63.332 CEREBROVASCULAR ACCIDENT (CVA) DUE TO THROMBOSIS OF LEFT POSTERIOR CEREBRAL ARTERY (HCC): ICD-10-CM

## 2022-01-11 DIAGNOSIS — I10 BENIGN ESSENTIAL HTN: ICD-10-CM

## 2022-01-11 RX ORDER — AMLODIPINE BESYLATE 5 MG/1
TABLET ORAL
Qty: 90 TABLET | Refills: 1 | Status: SHIPPED | OUTPATIENT
Start: 2022-01-11 | End: 2022-07-16

## 2022-01-11 RX ORDER — ATORVASTATIN CALCIUM 10 MG/1
TABLET, FILM COATED ORAL
Qty: 90 TABLET | Refills: 1 | Status: SHIPPED | OUTPATIENT
Start: 2022-01-11 | End: 2022-07-16

## 2022-01-19 ENCOUNTER — OFFICE VISIT (OUTPATIENT)
Dept: INTERNAL MEDICINE CLINIC | Age: 87
End: 2022-01-19
Payer: MEDICARE

## 2022-01-19 VITALS
TEMPERATURE: 98.2 F | HEART RATE: 73 BPM | SYSTOLIC BLOOD PRESSURE: 143 MMHG | WEIGHT: 244.6 LBS | DIASTOLIC BLOOD PRESSURE: 86 MMHG | BODY MASS INDEX: 29.79 KG/M2 | OXYGEN SATURATION: 98 % | HEIGHT: 76 IN | RESPIRATION RATE: 14 BRPM

## 2022-01-19 DIAGNOSIS — M75.22 TENDONITIS, BICIPITAL, LEFT: ICD-10-CM

## 2022-01-19 DIAGNOSIS — M19.049 HAND ARTHRITIS: Primary | ICD-10-CM

## 2022-01-19 DIAGNOSIS — N18.30 STAGE 3 CHRONIC KIDNEY DISEASE, UNSPECIFIED WHETHER STAGE 3A OR 3B CKD (HCC): ICD-10-CM

## 2022-01-19 DIAGNOSIS — C64.2 CLEAR CELL RENAL CELL CARCINOMA, LEFT (HCC): ICD-10-CM

## 2022-01-19 DIAGNOSIS — I48.3 TYPICAL ATRIAL FLUTTER (HCC): ICD-10-CM

## 2022-01-19 PROCEDURE — G8427 DOCREV CUR MEDS BY ELIG CLIN: HCPCS | Performed by: INTERNAL MEDICINE

## 2022-01-19 PROCEDURE — G0463 HOSPITAL OUTPT CLINIC VISIT: HCPCS | Performed by: INTERNAL MEDICINE

## 2022-01-19 PROCEDURE — G8536 NO DOC ELDER MAL SCRN: HCPCS | Performed by: INTERNAL MEDICINE

## 2022-01-19 PROCEDURE — G8753 SYS BP > OR = 140: HCPCS | Performed by: INTERNAL MEDICINE

## 2022-01-19 PROCEDURE — 99214 OFFICE O/P EST MOD 30 MIN: CPT | Performed by: INTERNAL MEDICINE

## 2022-01-19 PROCEDURE — 1101F PT FALLS ASSESS-DOCD LE1/YR: CPT | Performed by: INTERNAL MEDICINE

## 2022-01-19 PROCEDURE — G8417 CALC BMI ABV UP PARAM F/U: HCPCS | Performed by: INTERNAL MEDICINE

## 2022-01-19 PROCEDURE — G8754 DIAS BP LESS 90: HCPCS | Performed by: INTERNAL MEDICINE

## 2022-01-19 PROCEDURE — G8510 SCR DEP NEG, NO PLAN REQD: HCPCS | Performed by: INTERNAL MEDICINE

## 2022-01-19 RX ORDER — DICLOFENAC SODIUM 10 MG/G
GEL TOPICAL 4 TIMES DAILY
Qty: 100 G | Refills: 5 | Status: SHIPPED | OUTPATIENT
Start: 2022-01-19 | End: 2022-04-11 | Stop reason: ALTCHOICE

## 2022-01-19 NOTE — PATIENT INSTRUCTIONS
Biceps Tendinitis: Exercises  Introduction  Here are some examples of exercises for you to try. The exercises may be suggested for a condition or for rehabilitation. Start each exercise slowly. Ease off the exercises if you start to have pain. You will be told when to start these exercises and which ones will work best for you. How to do the exercises  Biceps stretch    1. Stand and hold your affected arm out to the side, with your hand at about hip level. 2. Gently bend your wrist back so that your fingers point down toward the floor. 3. You may also do this next to a wall and rest your fingers on the wall. 4. For more of a stretch, bend your head to the opposite side of your affected arm. 5. Hold for 15 to 30 seconds. 6. Repeat 2 to 4 times. Resisted supination    For this and the following exercises, you will need elastic exercise material, such as surgical tubing or Thera-Band. 1. Sit leaning forward with your legs slightly spread. Then place your forearm on your thigh with your hand and affected wrist in front of your knee. 2. Grasp one end of an exercise band with your palm down, and step on the other end. 3. Keeping your wrist straight, roll your palm outward and away from your thigh for a count of 2, then slowly move your wrist back to the starting position to a count of 5.  4. Repeat 8 to 12 times. Resisted elbow flexion    1. Using your affected arm, hold one end of an elastic band in your hand. 2. Place the other end of the band under your foot on the same side of your body as your affected arm. 3. Slowly bend your elbow and bring your hand toward your shoulder. Your palm and the underside of your wrist should be facing up as you pull the band toward your shoulder. Count to 2 as you pull up. 4. Relax and slowly return to your starting position. Count to 5 as you return to the start. 5. Repeat 8 to 12 times. Resisted elbow flexion at shoulder level    1.  Tie the ends of an exercise band together to form a loop. Attach one end of the loop to a secure object or shut a door on it to hold it in place. (Or you can have someone hold one end of the loop to provide resistance.) The band should be at shoulder level. 2. Stand facing where you have tied or fastened the band. 3. Start with your affected arm held out straight, holding the band in your hand. 4. Slowly bend your elbow to 90 degrees, bringing your hand toward your forehead. Count to 2 as you pull the band toward your head. 5. Relax and slowly return to your starting position. Count to 5 as you return to the start. 6. Repeat 8 to 12 times. Follow-up care is a key part of your treatment and safety. Be sure to make and go to all appointments, and call your doctor if you are having problems. It's also a good idea to know your test results and keep a list of the medicines you take. Where can you learn more? Go to http://www.gray.com/  Enter U032 in the search box to learn more about \"Biceps Tendinitis: Exercises. \"  Current as of: July 1, 2021               Content Version: 13.0  © 4596-9233 [employer]. Care instructions adapted under license by [employer] (which disclaims liability or warranty for this information). If you have questions about a medical condition or this instruction, always ask your healthcare professional. [place] [address] any warranty or liability for your use of this information. Hand Arthritis: Exercises  Introduction  Here are some examples of exercises for you to try. The exercises may be suggested for a condition or for rehabilitation. Start each exercise slowly. Ease off the exercises if you start to have pain. You will be told when to start these exercises and which ones will work best for you. How to do the exercises  Tendon glides    1. In this exercise, the steps follow one another to a make a continuous movement.   2. With your affected hand, point your fingers and thumb straight up. Your wrist should be relaxed, following the line of your fingers and thumb. 3. Curl your fingers so that the top two joints in them are bent, and your fingers wrap down. Your fingertips should touch or be near the base of your fingers. Your fingers will look like a hook. 4. Make a fist by bending your knuckles. Your thumb can gently rest against your index (pointing) finger. 5. Unwind your fingers slightly so that your fingertips can touch the base of your palm. Your thumb can rest against your index finger. 6. Move back to your starting position, with your fingers and thumb pointing up. 7. Repeat the series of motions 8 to 12 times. 8. Switch hands and repeat steps 1 through 6, even if only one hand is sore. Intrinsic flexion    1. Rest your affected hand on a table and bend the large joints where your fingers connect to your hand. Keep your thumb and the other joints in your fingers straight. 2. Slowly straighten your fingers. Your wrist should be relaxed, following the line of your fingers and thumb. 3. Move back to your starting position, with your hand bent. 4. Repeat 8 to 12 times. 5. Switch hands and repeat steps 1 through 4, even if only one hand is sore. Finger extension    1. Place your affected hand flat on a table. 2. Lift and then lower one finger at a time off the table. 3. Repeat 8 to 12 times. 4. Switch hands and repeat steps 1 through 3, even if only one hand is sore. MP extension    1. Place your good hand on a table, palm up. Put your affected hand on top of your good hand with your fingers wrapped around the thumb of your good hand like you are making a fist.  2. Slowly uncurl the joints of your affected hand where your fingers connect to your hand so that only the top two joints of your fingers are bent. Your fingers will look like a hook.   3. Move back to your starting position, with your fingers wrapped around your good thumb.  4. Repeat 8 to 12 times. 5. Switch hands and repeat steps 1 through 4, even if only one hand is sore. PIP extension (with MP extension)    1. Place your good hand on a table, palm up. Put your affected hand on top of your good hand, palm up. 2. Use the thumb and fingers of your good hand to grasp below the middle joint of one finger of your affected hand. 3. Straighten the last two joints of that finger. 4. Repeat 8 to 12 times. 5. Repeat steps 1 through 4 with each finger. 6. Switch hands and repeat steps 1 through 5, even if only one hand is sore. DIP flexion    1. With your good hand, grasp one finger of your affected hand. Your thumb will be on the top side of your finger just below the joint that is closest to your fingernail. 2. Slowly bend your affected finger only at the joint closest to your fingernail. 3. Repeat 8 to 12 times. 4. Repeat steps 1 through 3 with each finger. 5. Switch hands and repeat steps 1 through 4, even if only one hand is sore. Follow-up care is a key part of your treatment and safety. Be sure to make and go to all appointments, and call your doctor if you are having problems. It's also a good idea to know your test results and keep a list of the medicines you take. Where can you learn more? Go to http://www.gray.com/  Enter E040 in the search box to learn more about \"Hand Arthritis: Exercises. \"  Current as of: July 1, 2021               Content Version: 13.0  © 4005-5640 [employer]. Care instructions adapted under license by [employer] (which disclaims liability or warranty for this information). If you have questions about a medical condition or this instruction, always ask your healthcare professional. [place] [address] any warranty or liability for your use of this information.

## 2022-01-20 PROBLEM — I48.92 ATRIAL FLUTTER (HCC): Status: ACTIVE | Noted: 2021-06-30

## 2022-01-20 RX ORDER — APIXABAN 5 MG/1
5 TABLET, FILM COATED ORAL EVERY 12 HOURS
Qty: 60 TABLET | Refills: 5 | Status: SHIPPED | OUTPATIENT
Start: 2022-01-20

## 2022-01-21 NOTE — PROGRESS NOTES
HISTORY OF PRESENT ILLNESS    Chief Complaint   Patient presents with    Joint Pain    Arm Pain     Left arm - hand stays shut sometimes       Presents with left shoulder pain for several weeks. No specific injuries but he has been very busy around the house, doing lots of projects including repetitive activities. Denies any significant neck pain or weakness of his arm. Hurts to lift his arm above his head. Anterior shoulder pain. No specific other injuries. Bilateral hand stiffness. This has been chronic. Sometimes his hands are difficult to open because they are stiff. Denies any swelling of joints. Review of Systems   All other systems reviewed and are negative, except as noted in HPI    Past Medical and Surgical History   has a past medical history of Arthritis, Benign essential HTN, Clear cell renal cell carcinoma, left ([facility] [address] (10/03/2018), GERD (gastroesophageal reflux disease), Hernia, Iron deficiency anemia, Pulmonary emboli ([facility] [address] (2014), PVC's (premature ventricular contractions), and White coat syndrome with hypertension (2/15/2018). has a past surgical history that includes hx orthopaedic; hx colonoscopy (03/05/2015); hx endoscopy (2014); and hx nephrectomy (Left, 09/20/2018). reports that he quit smoking about 34 years ago. He has a 5.00 pack-year smoking history. He has never used smokeless tobacco. He reports current alcohol use of about 3.0 standard drinks of alcohol per week. He reports that he does not use drugs. family history includes Cancer in his brother; No Known Problems in his father, mother, and sister; Stroke in his sister. Physical Exam   Nursing note and vitals reviewed. Blood pressure (!) 143/86, pulse 73, temperature 98.2 °F (36.8 °C), temperature source Oral, resp. rate 14, height 6' 4\" (1.93 m), weight 244 lb 9.6 oz (110.9 kg), SpO2 98 %. Constitutional: In no distress. Eyes: Conjunctivae are normal.  HEENT:  No LAD or thyromegaly   Cardiovascular: Normal rate. regular rhythm. No murmurs  No edema  Pulmonary/Chest: Effort normal. clear to ausculation blaterally  Musculoskeletal:  no edema. Left anterior shoulder pain with bicipital tenderness on palpation. Discomfort with abduction and abduction. Negative empty can sign. Negative posterior scapular sign. Abd:  Neurological: Alert and oriented. Grossly intact cranial nerves and motor function. Skin: No visible rash noted. Psychiatric: Normal mood and affect. Behavior is normal.     Diagnoses and all orders for this visit:    1. Hand arthritis  Benign. Overuse arthritis. Trial of diclofenac gel as needed could consider glucosamine/chondroitin.  -     diclofenac (VOLTAREN) 1 % gel; Apply  to affected area four (4) times daily. 2. Tendonitis, bicipital, left  Stretching demonstrated. Overuse. Voltaren as needed. Consider physical therapy. -     diclofenac (VOLTAREN) 1 % gel; Apply  to affected area four (4) times daily. 3. Clear cell renal cell carcinoma, left (HCC)  This condition appears to be stable and is being evaluated and managed by his specialist Dr. Raman Garrett. No acute findings today warrant change in management plan. 4. Typical atrial flutter ([address]  This condition appears to be stable. No acute findings today warrant change in management plan. 5. Stage 3 chronic kidney disease, unspecified whether stage 3a or 3b CKD ([facility] [address]  This condition appears to be stable and is being evaluated and managed by his specialist Dr. Raman Garrett. No acute findings today warrant change in management plan. Lab Results   Component Value Date/Time    Creatinine (POC) 1.0 07/03/2018 01:15 PM    Creatinine 1.50 (H) 05/27/2021 09:10 AM       lab results and schedule of future lab studies reviewed with patient  reviewed medications and side effects in detail    Return to clinic for further evaluation if new symptoms develop or if current symptoms worsen or fail to resolve.      Patient Instructions        Biceps Tendinitis: Exercises  Introduction  Here are some examples of exercises for you to try. The exercises may be suggested for a condition or for rehabilitation. Start each exercise slowly. Ease off the exercises if you start to have pain. You will be told when to start these exercises and which ones will work best for you. How to do the exercises  Biceps stretch    1. Stand and hold your affected arm out to the side, with your hand at about hip level. 2. Gently bend your wrist back so that your fingers point down toward the floor. 3. You may also do this next to a wall and rest your fingers on the wall. 4. For more of a stretch, bend your head to the opposite side of your affected arm. 5. Hold for 15 to 30 seconds. 6. Repeat 2 to 4 times. Resisted supination    For this and the following exercises, you will need elastic exercise material, such as surgical tubing or Thera-Band. 1. Sit leaning forward with your legs slightly spread. Then place your forearm on your thigh with your hand and affected wrist in front of your knee. 2. Grasp one end of an exercise band with your palm down, and step on the other end. 3. Keeping your wrist straight, roll your palm outward and away from your thigh for a count of 2, then slowly move your wrist back to the starting position to a count of 5.  4. Repeat 8 to 12 times. Resisted elbow flexion    1. Using your affected arm, hold one end of an elastic band in your hand. 2. Place the other end of the band under your foot on the same side of your body as your affected arm. 3. Slowly bend your elbow and bring your hand toward your shoulder. Your palm and the underside of your wrist should be facing up as you pull the band toward your shoulder. Count to 2 as you pull up. 4. Relax and slowly return to your starting position. Count to 5 as you return to the start. 5. Repeat 8 to 12 times. Resisted elbow flexion at shoulder level    1.  Tie the ends of an exercise band together to form a loop. Attach one end of the loop to a secure object or shut a door on it to hold it in place. (Or you can have someone hold one end of the loop to provide resistance.) The band should be at shoulder level. 2. Stand facing where you have tied or fastened the band. 3. Start with your affected arm held out straight, holding the band in your hand. 4. Slowly bend your elbow to 90 degrees, bringing your hand toward your forehead. Count to 2 as you pull the band toward your head. 5. Relax and slowly return to your starting position. Count to 5 as you return to the start. 6. Repeat 8 to 12 times. Follow-up care is a key part of your treatment and safety. Be sure to make and go to all appointments, and call your doctor if you are having problems. It's also a good idea to know your test results and keep a list of the medicines you take. Where can you learn more? Go to http://www.gray.com/  Enter J913 in the search box to learn more about \"Biceps Tendinitis: Exercises. \"  Current as of: July 1, 2021               Content Version: 13.0  © 7184-4452 [employer]. Care instructions adapted under license by [employer] (which disclaims liability or warranty for this information). If you have questions about a medical condition or this instruction, always ask your healthcare professional. [place] [address] any warranty or liability for your use of this information. Hand Arthritis: Exercises  Introduction  Here are some examples of exercises for you to try. The exercises may be suggested for a condition or for rehabilitation. Start each exercise slowly. Ease off the exercises if you start to have pain. You will be told when to start these exercises and which ones will work best for you. How to do the exercises  Tendon glides    1. In this exercise, the steps follow one another to a make a continuous movement.   2. With your affected hand, point your fingers and thumb straight up. Your wrist should be relaxed, following the line of your fingers and thumb. 3. Curl your fingers so that the top two joints in them are bent, and your fingers wrap down. Your fingertips should touch or be near the base of your fingers. Your fingers will look like a hook. 4. Make a fist by bending your knuckles. Your thumb can gently rest against your index (pointing) finger. 5. Unwind your fingers slightly so that your fingertips can touch the base of your palm. Your thumb can rest against your index finger. 6. Move back to your starting position, with your fingers and thumb pointing up. 7. Repeat the series of motions 8 to 12 times. 8. Switch hands and repeat steps 1 through 6, even if only one hand is sore. Intrinsic flexion    1. Rest your affected hand on a table and bend the large joints where your fingers connect to your hand. Keep your thumb and the other joints in your fingers straight. 2. Slowly straighten your fingers. Your wrist should be relaxed, following the line of your fingers and thumb. 3. Move back to your starting position, with your hand bent. 4. Repeat 8 to 12 times. 5. Switch hands and repeat steps 1 through 4, even if only one hand is sore. Finger extension    1. Place your affected hand flat on a table. 2. Lift and then lower one finger at a time off the table. 3. Repeat 8 to 12 times. 4. Switch hands and repeat steps 1 through 3, even if only one hand is sore. MP extension    1. Place your good hand on a table, palm up. Put your affected hand on top of your good hand with your fingers wrapped around the thumb of your good hand like you are making a fist.  2. Slowly uncurl the joints of your affected hand where your fingers connect to your hand so that only the top two joints of your fingers are bent. Your fingers will look like a hook.   3. Move back to your starting position, with your fingers wrapped around your good thumb.  4. Repeat 8 to 12 times. 5. Switch hands and repeat steps 1 through 4, even if only one hand is sore. PIP extension (with MP extension)    1. Place your good hand on a table, palm up. Put your affected hand on top of your good hand, palm up. 2. Use the thumb and fingers of your good hand to grasp below the middle joint of one finger of your affected hand. 3. Straighten the last two joints of that finger. 4. Repeat 8 to 12 times. 5. Repeat steps 1 through 4 with each finger. 6. Switch hands and repeat steps 1 through 5, even if only one hand is sore. DIP flexion    1. With your good hand, grasp one finger of your affected hand. Your thumb will be on the top side of your finger just below the joint that is closest to your fingernail. 2. Slowly bend your affected finger only at the joint closest to your fingernail. 3. Repeat 8 to 12 times. 4. Repeat steps 1 through 3 with each finger. 5. Switch hands and repeat steps 1 through 4, even if only one hand is sore. Follow-up care is a key part of your treatment and safety. Be sure to make and go to all appointments, and call your doctor if you are having problems. It's also a good idea to know your test results and keep a list of the medicines you take. Where can you learn more? Go to http://www.gray.com/  Enter E040 in the search box to learn more about \"Hand Arthritis: Exercises. \"  Current as of: July 1, 2021               Content Version: 13.0  © 2506-3520 [employer]. Care instructions adapted under license by [employer] (which disclaims liability or warranty for this information). If you have questions about a medical condition or this instruction, always ask your healthcare professional. [place] [address] any warranty or liability for your use of this information.

## 2022-01-31 ENCOUNTER — TELEPHONE (OUTPATIENT)
Dept: INTERNAL MEDICINE CLINIC | Age: 87
End: 2022-01-31

## 2022-01-31 NOTE — TELEPHONE ENCOUNTER
Spoke with patient and he reports that initially the pharmacy told him that he had to pay $500.00 for his Eliquis. While speaking with him on the phone the pharmacist told him that his insurance paid for it. Patient states never mind I guess it's okay. Grateful for the call.

## 2022-01-31 NOTE — TELEPHONE ENCOUNTER
Pt wife calling states this medication is way too much money \"Eliquis\". Pt wife wants to know if the doctor could switch that medication to \"Janbozen \". Pt is all out of that medication and needs it by today.  Please call back and advise

## 2022-01-31 NOTE — TELEPHONE ENCOUNTER
Patient's wife is calling to see if the doctor can prescribe the generic form of Eliquis. She states the pharmacy is charging $500 for Eliquis. Patient's wife states he is out of the medication so it is important he gets a refill today. PSR will route this message to the doctor as the nurse has likely gone home for the day.

## 2022-01-31 NOTE — TELEPHONE ENCOUNTER
There is no generic Eliquis. They likely have a high deductible that they have to pay at the start of the year for pharmacy benefits to kick in. So, it may be a couple of months of high payment before it will be less expensive again. Sometimes a 90-day supply or mail order would help. Could consider changing to Xarelto, but likely also will be expensive. Could consider changing to Coumadin, but would need to monitor INRs in the office. He may consider contacting his hematologist, Murray Puri or the cardiologist, to see if they have samples. Reassure that being off medication for a couple of days is not a very high risk situation, but is preferred to fill it as soon as possible.

## 2022-02-01 NOTE — TELEPHONE ENCOUNTER
Spoke with Elaina/Spouse (HIPPA Verified). Updated on Dr. Felipe Turner comments and recommendations. She states understanding. Grateful for the call.

## 2022-03-08 DIAGNOSIS — I10 BENIGN ESSENTIAL HTN: ICD-10-CM

## 2022-03-08 RX ORDER — LISINOPRIL 20 MG/1
TABLET ORAL
Qty: 90 TABLET | Refills: 0 | Status: SHIPPED | OUTPATIENT
Start: 2022-03-08 | End: 2022-06-08

## 2022-03-18 PROBLEM — C64.2 CLEAR CELL RENAL CELL CARCINOMA, LEFT (HCC): Status: ACTIVE | Noted: 2018-10-03

## 2022-03-18 PROBLEM — N18.30 CKD (CHRONIC KIDNEY DISEASE) STAGE 3, GFR 30-59 ML/MIN (HCC): Status: ACTIVE | Noted: 2020-08-06

## 2022-03-18 PROBLEM — Z90.5 HX OF UNILATERAL NEPHRECTOMY: Status: ACTIVE | Noted: 2018-10-03

## 2022-03-19 PROBLEM — I10 WHITE COAT SYNDROME WITH HYPERTENSION: Status: ACTIVE | Noted: 2018-02-15

## 2022-03-19 PROBLEM — I48.92 ATRIAL FLUTTER (HCC): Status: ACTIVE | Noted: 2021-06-30

## 2022-03-19 PROBLEM — Z71.89 ADVANCED CARE PLANNING/COUNSELING DISCUSSION: Status: ACTIVE | Noted: 2017-07-07

## 2022-04-11 ENCOUNTER — OFFICE VISIT (OUTPATIENT)
Dept: INTERNAL MEDICINE CLINIC | Age: 87
End: 2022-04-11

## 2022-04-11 VITALS
BODY MASS INDEX: 29.37 KG/M2 | RESPIRATION RATE: 15 BRPM | OXYGEN SATURATION: 98 % | DIASTOLIC BLOOD PRESSURE: 80 MMHG | HEIGHT: 76 IN | TEMPERATURE: 97.9 F | HEART RATE: 63 BPM | SYSTOLIC BLOOD PRESSURE: 147 MMHG | WEIGHT: 241.2 LBS

## 2022-07-16 DIAGNOSIS — I10 BENIGN ESSENTIAL HTN: ICD-10-CM

## 2022-07-16 DIAGNOSIS — I63.332 CEREBROVASCULAR ACCIDENT (CVA) DUE TO THROMBOSIS OF LEFT POSTERIOR CEREBRAL ARTERY (HCC): ICD-10-CM

## 2022-07-16 RX ORDER — AMLODIPINE BESYLATE 5 MG/1
TABLET ORAL
Qty: 90 TABLET | Refills: 1 | Status: SHIPPED | OUTPATIENT
Start: 2022-07-16

## 2022-07-16 RX ORDER — ATORVASTATIN CALCIUM 10 MG/1
TABLET, FILM COATED ORAL
Qty: 90 TABLET | Refills: 1 | Status: SHIPPED | OUTPATIENT
Start: 2022-07-16

## 2023-05-19 RX ORDER — LISINOPRIL 20 MG/1
1 TABLET ORAL DAILY
COMMUNITY
Start: 2022-06-08

## 2023-05-19 RX ORDER — LANOLIN ALCOHOL/MO/W.PET/CERES
1 CREAM (GRAM) TOPICAL
COMMUNITY
Start: 2021-10-03

## 2023-05-19 RX ORDER — ATORVASTATIN CALCIUM 10 MG/1
TABLET, FILM COATED ORAL
COMMUNITY
Start: 2022-07-16

## 2023-05-19 RX ORDER — TRAZODONE HYDROCHLORIDE 50 MG/1
50 TABLET ORAL
COMMUNITY
Start: 2022-01-24

## 2023-05-19 RX ORDER — AMLODIPINE BESYLATE 5 MG/1
TABLET ORAL
COMMUNITY
Start: 2022-07-16

## 2023-05-19 RX ORDER — OMEPRAZOLE 20 MG/1
20 CAPSULE, DELAYED RELEASE ORAL DAILY
COMMUNITY

## (undated) DEVICE — AGENT HEMSTAT W4XL4IN OXIDIZED REGENERATED CELOS ABSRB SFT

## (undated) DEVICE — STERILE POLYISOPRENE POWDER-FREE SURGICAL GLOVES: Brand: PROTEXIS

## (undated) DEVICE — REM POLYHESIVE ADULT PATIENT RETURN ELECTRODE: Brand: VALLEYLAB

## (undated) DEVICE — VISUALIZATION SYSTEM: Brand: CLEARIFY

## (undated) DEVICE — 3M™ MEDIPORE™ H SOFT CLOTH TAPE SHORT ROLL TAPE 6INCHES X 2 YARDS 16 ROLLS/CASE 2866S: Brand: 3M™ MEDIPORE™

## (undated) DEVICE — NEEDLE HYPO 22GA L1.5IN BLK POLYPR HUB S STL REG BVL STR

## (undated) DEVICE — SUTURE PDS II SZ 1 L27IN ABSRB VLT CT-1 L36MM 1/2 CIR Z341H

## (undated) DEVICE — Z CONVERTED USE 2276507 CONNECTOR TBNG POLYPR 5IN1 TOUGH SHATTERPROOF CLN FOR

## (undated) DEVICE — DEVON™ KNEE AND BODY STRAP 60" X 3" (1.5 M X 7.6 CM): Brand: DEVON

## (undated) DEVICE — CLICKLINE SCISSORS INSERT: Brand: CLICKLINE

## (undated) DEVICE — INFECTION CONTROL KIT SYS

## (undated) DEVICE — SURGICAL PROCEDURE KIT GEN LAPAROSCOPY LF

## (undated) DEVICE — TRAY CATH OD16FR SIL URIN M STATLOK STBL DEV SURSTP

## (undated) DEVICE — LIGHT HANDLE: Brand: DEVON

## (undated) DEVICE — TUBING FLTR PLUME AWAY EVAC W/ SUCT DEV DISP PUREVIEW

## (undated) DEVICE — Device

## (undated) DEVICE — SOLUTION IV 1000ML 0.9% SOD CHL

## (undated) DEVICE — 3M™ DURAPORE™ SURGICAL TAPE 1538-3, 3 INCH X 10 YARD (7,5CM X 9,1M), 4 ROLLS/BOX: Brand: 3M™ DURAPORE™

## (undated) DEVICE — BLADELESS OPTICAL TROCAR WITH FIXATION CANNULA: Brand: VERSAONE

## (undated) DEVICE — ACCESS PLATFORM FOR MINIMALLY INVASIVE SURGERY.: Brand: GELPORT® LAPAROSCOPIC  SYSTEM

## (undated) DEVICE — (D)PREP SKN CHLRAPRP APPL 26ML -- CONVERT TO ITEM 371833

## (undated) DEVICE — SUT ETHLN 2-0 18IN FS BLK --

## (undated) DEVICE — SUTURE SZ 0 27IN 5/8 CIR UR-6  TAPER PT VIOLET ABSRB VICRYL J603H

## (undated) DEVICE — APPLICATOR BNDG 1MM ADH PREMIERPRO EXOFIN

## (undated) DEVICE — SOLUTION IRRIG 3000ML 0.9% SOD CHL FLX CONT 0797208] ICU MEDICAL INC]

## (undated) DEVICE — TUBING INSUF HEAT STRL 10 FT --

## (undated) DEVICE — TELFA NON-ADHERENT ABSORBENT DRESSING: Brand: TELFA

## (undated) DEVICE — 3000CC GUARDIAN II: Brand: GUARDIAN

## (undated) DEVICE — SPONGE LAP 18X18IN STRL -- 5/PK

## (undated) DEVICE — CONTAINER,SPECIMEN,3OZ,OR STRL: Brand: MEDLINE

## (undated) DEVICE — SUTURE MCRYL SZ 4-0 L27IN ABSRB UD L19MM PS-2 1/2 CIR PRIM Y426H

## (undated) DEVICE — GOWN SURG XL BRTH FLD LINT ABRASION RESISTANCE RAGLAN SLV

## (undated) DEVICE — MARYLAND JAW LAPAROSCOPIC SEALER/DIVIDER COATED: Brand: LIGASURE

## (undated) DEVICE — 3M™ IOBAN™ 2 ANTIMICROBIAL INCISE DRAPE 6650EZ: Brand: IOBAN™ 2

## (undated) DEVICE — RELOAD STPL H1-2.5X45MM VASC THN TISS WHT 6 ROW B FRM SGL

## (undated) DEVICE — CLIP LIG ABSRB HEM-LOK LG PUR --

## (undated) DEVICE — CUTTER ENDOSCP L340MM LIN ARTC SGL STROKE FIRING ENDOPATH